# Patient Record
Sex: MALE | Race: BLACK OR AFRICAN AMERICAN | NOT HISPANIC OR LATINO | URBAN - METROPOLITAN AREA
[De-identification: names, ages, dates, MRNs, and addresses within clinical notes are randomized per-mention and may not be internally consistent; named-entity substitution may affect disease eponyms.]

---

## 2023-07-07 DIAGNOSIS — I10 PRIMARY HYPERTENSION: ICD-10-CM

## 2023-07-07 DIAGNOSIS — E78.5 HYPERLIPIDEMIA LDL GOAL <100: ICD-10-CM

## 2023-07-07 DIAGNOSIS — E11.9 TYPE 2 DIABETES MELLITUS WITHOUT COMPLICATION, WITHOUT LONG-TERM CURRENT USE OF INSULIN (MULTI): ICD-10-CM

## 2023-07-07 RX ORDER — ATORVASTATIN CALCIUM 40 MG/1
40 TABLET, FILM COATED ORAL DAILY
COMMUNITY
Start: 2022-03-28 | End: 2023-07-07 | Stop reason: SDUPTHER

## 2023-07-07 RX ORDER — SITAGLIPTIN 25 MG/1
25 TABLET, FILM COATED ORAL DAILY
COMMUNITY
Start: 2022-03-28 | End: 2023-07-07 | Stop reason: SDUPTHER

## 2023-07-07 RX ORDER — METFORMIN HYDROCHLORIDE 500 MG/1
2 TABLET ORAL 2 TIMES DAILY
COMMUNITY
Start: 2022-02-18 | End: 2023-07-07 | Stop reason: SDUPTHER

## 2023-07-07 RX ORDER — AMLODIPINE BESYLATE 10 MG/1
10 TABLET ORAL DAILY
COMMUNITY
Start: 2022-03-28 | End: 2023-07-07 | Stop reason: SDUPTHER

## 2023-07-07 RX ORDER — LISINOPRIL 10 MG/1
10 TABLET ORAL DAILY
COMMUNITY
Start: 2022-03-28 | End: 2023-07-07 | Stop reason: SDUPTHER

## 2023-07-08 RX ORDER — ATORVASTATIN CALCIUM 40 MG/1
40 TABLET, FILM COATED ORAL DAILY
Qty: 90 TABLET | Refills: 0 | Status: SHIPPED | OUTPATIENT
Start: 2023-07-08 | End: 2023-10-19 | Stop reason: SDUPTHER

## 2023-07-08 RX ORDER — LISINOPRIL 10 MG/1
10 TABLET ORAL DAILY
Qty: 90 TABLET | Refills: 0 | Status: SHIPPED | OUTPATIENT
Start: 2023-07-08 | End: 2023-08-08 | Stop reason: ENTERED-IN-ERROR

## 2023-07-08 RX ORDER — AMLODIPINE BESYLATE 10 MG/1
10 TABLET ORAL DAILY
Qty: 90 TABLET | Refills: 0 | Status: SHIPPED | OUTPATIENT
Start: 2023-07-08 | End: 2023-10-19 | Stop reason: SDUPTHER

## 2023-07-08 RX ORDER — METFORMIN HYDROCHLORIDE 500 MG/1
1000 TABLET ORAL 2 TIMES DAILY
Qty: 360 TABLET | Refills: 0 | Status: SHIPPED | OUTPATIENT
Start: 2023-07-08 | End: 2023-10-19 | Stop reason: SDUPTHER

## 2023-07-18 ENCOUNTER — APPOINTMENT (OUTPATIENT)
Dept: PRIMARY CARE | Facility: CLINIC | Age: 58
End: 2023-07-18
Payer: COMMERCIAL

## 2023-07-28 ENCOUNTER — OFFICE VISIT (OUTPATIENT)
Dept: PRIMARY CARE | Facility: CLINIC | Age: 58
End: 2023-07-28
Payer: COMMERCIAL

## 2023-07-28 DIAGNOSIS — I10 HYPERTENSION, UNSPECIFIED TYPE: Primary | ICD-10-CM

## 2023-07-28 DIAGNOSIS — Z12.5 SCREENING FOR PROSTATE CANCER: ICD-10-CM

## 2023-07-28 DIAGNOSIS — E78.5 HYPERLIPIDEMIA, UNSPECIFIED HYPERLIPIDEMIA TYPE: ICD-10-CM

## 2023-07-28 DIAGNOSIS — E11.69 TYPE 2 DIABETES MELLITUS WITH OTHER SPECIFIED COMPLICATION, WITHOUT LONG-TERM CURRENT USE OF INSULIN (MULTI): ICD-10-CM

## 2023-07-29 LAB
ALBUMIN (MG/L) IN URINE: 264.8 MG/L
ALBUMIN/CREATININE (UG/MG) IN URINE: 161.5 UG/MG CRT (ref 0–30)
CREATININE (MG/DL) IN URINE: 164 MG/DL (ref 20–370)
ESTIMATED AVERAGE GLUCOSE FOR HBA1C: 192 MG/DL
HEMOGLOBIN A1C/HEMOGLOBIN TOTAL IN BLOOD: 8.3 %
PROSTATE SPECIFIC ANTIGEN,SCREEN: 0.32 NG/ML (ref 0–4)

## 2023-08-08 ENCOUNTER — OFFICE VISIT (OUTPATIENT)
Dept: PRIMARY CARE | Facility: CLINIC | Age: 58
End: 2023-08-08
Payer: COMMERCIAL

## 2023-08-08 VITALS
WEIGHT: 232.6 LBS | BODY MASS INDEX: 35.25 KG/M2 | HEART RATE: 83 BPM | DIASTOLIC BLOOD PRESSURE: 76 MMHG | HEIGHT: 68 IN | OXYGEN SATURATION: 100 % | SYSTOLIC BLOOD PRESSURE: 150 MMHG

## 2023-08-08 DIAGNOSIS — Z09 FOLLOW-UP EXAM: Primary | ICD-10-CM

## 2023-08-08 DIAGNOSIS — E11.3213 BOTH EYES AFFECTED BY MILD NONPROLIFERATIVE DIABETIC RETINOPATHY WITH MACULAR EDEMA, ASSOCIATED WITH TYPE 2 DIABETES MELLITUS (MULTI): ICD-10-CM

## 2023-08-08 DIAGNOSIS — E66.01 CLASS 2 SEVERE OBESITY DUE TO EXCESS CALORIES WITH SERIOUS COMORBIDITY AND BODY MASS INDEX (BMI) OF 35.0 TO 35.9 IN ADULT (MULTI): ICD-10-CM

## 2023-08-08 DIAGNOSIS — Z13.6 SCREENING, ISCHEMIC HEART DISEASE: ICD-10-CM

## 2023-08-08 DIAGNOSIS — E11.9 TYPE 2 DIABETES MELLITUS WITHOUT COMPLICATION, WITHOUT LONG-TERM CURRENT USE OF INSULIN (MULTI): ICD-10-CM

## 2023-08-08 DIAGNOSIS — G47.33 OBSTRUCTIVE SLEEP APNEA: ICD-10-CM

## 2023-08-08 DIAGNOSIS — R80.9 DIABETES MELLITUS DUE TO UNDERLYING CONDITION WITH MICROALBUMINURIA, WITHOUT LONG-TERM CURRENT USE OF INSULIN (MULTI): ICD-10-CM

## 2023-08-08 DIAGNOSIS — E08.29 DIABETES MELLITUS DUE TO UNDERLYING CONDITION WITH MICROALBUMINURIA, WITHOUT LONG-TERM CURRENT USE OF INSULIN (MULTI): ICD-10-CM

## 2023-08-08 DIAGNOSIS — I10 HYPERTENSION, UNCONTROLLED: ICD-10-CM

## 2023-08-08 DIAGNOSIS — I10 PRIMARY HYPERTENSION: ICD-10-CM

## 2023-08-08 PROBLEM — E66.812 CLASS 2 SEVERE OBESITY DUE TO EXCESS CALORIES WITH SERIOUS COMORBIDITY AND BODY MASS INDEX (BMI) OF 35.0 TO 35.9 IN ADULT: Status: ACTIVE | Noted: 2023-08-08

## 2023-08-08 PROCEDURE — 3008F BODY MASS INDEX DOCD: CPT | Performed by: INTERNAL MEDICINE

## 2023-08-08 PROCEDURE — 1036F TOBACCO NON-USER: CPT | Performed by: INTERNAL MEDICINE

## 2023-08-08 PROCEDURE — 3078F DIAST BP <80 MM HG: CPT | Performed by: INTERNAL MEDICINE

## 2023-08-08 PROCEDURE — 99213 OFFICE O/P EST LOW 20 MIN: CPT | Performed by: INTERNAL MEDICINE

## 2023-08-08 PROCEDURE — 3077F SYST BP >= 140 MM HG: CPT | Performed by: INTERNAL MEDICINE

## 2023-08-08 PROCEDURE — 3052F HG A1C>EQUAL 8.0%<EQUAL 9.0%: CPT | Performed by: INTERNAL MEDICINE

## 2023-08-08 PROCEDURE — 4010F ACE/ARB THERAPY RXD/TAKEN: CPT | Performed by: INTERNAL MEDICINE

## 2023-08-08 RX ORDER — LOSARTAN POTASSIUM 25 MG/1
25 TABLET ORAL DAILY
Qty: 30 TABLET | Refills: 11 | Status: SHIPPED | OUTPATIENT
Start: 2023-08-08 | End: 2023-10-19 | Stop reason: SDUPTHER

## 2023-08-08 ASSESSMENT — PATIENT HEALTH QUESTIONNAIRE - PHQ9
2. FEELING DOWN, DEPRESSED OR HOPELESS: NOT AT ALL
SUM OF ALL RESPONSES TO PHQ9 QUESTIONS 1 AND 2: 0
2. FEELING DOWN, DEPRESSED OR HOPELESS: NOT AT ALL
1. LITTLE INTEREST OR PLEASURE IN DOING THINGS: NOT AT ALL
SUM OF ALL RESPONSES TO PHQ9 QUESTIONS 1 AND 2: 0
1. LITTLE INTEREST OR PLEASURE IN DOING THINGS: NOT AT ALL

## 2023-08-08 ASSESSMENT — ENCOUNTER SYMPTOMS
PALPITATIONS: 0
SHORTNESS OF BREATH: 0
POLYDIPSIA: 0
POLYPHAGIA: 0
ORTHOPNEA: 0
HYPERTENSION: 1
VISUAL CHANGE: 0
CONSTITUTIONAL NEGATIVE: 1
PND: 0
DIABETIC ASSOCIATED SYMPTOMS: 0
BLURRED VISION: 0
FATIGUE: 0

## 2023-09-08 ENCOUNTER — OFFICE VISIT (OUTPATIENT)
Dept: PRIMARY CARE | Facility: CLINIC | Age: 58
End: 2023-09-08
Payer: COMMERCIAL

## 2023-09-08 ENCOUNTER — LAB (OUTPATIENT)
Dept: LAB | Facility: LAB | Age: 58
End: 2023-09-08
Payer: COMMERCIAL

## 2023-09-08 VITALS
HEART RATE: 79 BPM | SYSTOLIC BLOOD PRESSURE: 124 MMHG | WEIGHT: 237.8 LBS | OXYGEN SATURATION: 100 % | BODY MASS INDEX: 36.7 KG/M2 | DIASTOLIC BLOOD PRESSURE: 70 MMHG

## 2023-09-08 DIAGNOSIS — Z13.89 SCREENING FOR OBESITY: ICD-10-CM

## 2023-09-08 DIAGNOSIS — G47.33 OBSTRUCTIVE SLEEP APNEA: ICD-10-CM

## 2023-09-08 DIAGNOSIS — E66.01 CLASS 2 SEVERE OBESITY DUE TO EXCESS CALORIES WITH SERIOUS COMORBIDITY AND BODY MASS INDEX (BMI) OF 36.0 TO 36.9 IN ADULT (MULTI): ICD-10-CM

## 2023-09-08 DIAGNOSIS — E11.69 TYPE 2 DIABETES MELLITUS WITH OTHER SPECIFIED COMPLICATION, WITHOUT LONG-TERM CURRENT USE OF INSULIN (MULTI): ICD-10-CM

## 2023-09-08 DIAGNOSIS — E11.9 TYPE 2 DIABETES MELLITUS WITHOUT COMPLICATION, WITHOUT LONG-TERM CURRENT USE OF INSULIN (MULTI): ICD-10-CM

## 2023-09-08 DIAGNOSIS — I10 PRIMARY HYPERTENSION: ICD-10-CM

## 2023-09-08 DIAGNOSIS — E11.3213 BOTH EYES AFFECTED BY MILD NONPROLIFERATIVE DIABETIC RETINOPATHY WITH MACULAR EDEMA, ASSOCIATED WITH TYPE 2 DIABETES MELLITUS (MULTI): ICD-10-CM

## 2023-09-08 DIAGNOSIS — E11.69 TYPE 2 DIABETES MELLITUS WITH OTHER SPECIFIED COMPLICATION, WITHOUT LONG-TERM CURRENT USE OF INSULIN (MULTI): Primary | ICD-10-CM

## 2023-09-08 LAB
ALANINE AMINOTRANSFERASE (SGPT) (U/L) IN SER/PLAS: 36 U/L (ref 10–52)
ALBUMIN (G/DL) IN SER/PLAS: 4.3 G/DL (ref 3.4–5)
ALKALINE PHOSPHATASE (U/L) IN SER/PLAS: 114 U/L (ref 33–120)
ANION GAP IN SER/PLAS: 15 MMOL/L (ref 10–20)
ASPARTATE AMINOTRANSFERASE (SGOT) (U/L) IN SER/PLAS: 23 U/L (ref 9–39)
BILIRUBIN TOTAL (MG/DL) IN SER/PLAS: 0.7 MG/DL (ref 0–1.2)
CALCIUM (MG/DL) IN SER/PLAS: 9.5 MG/DL (ref 8.6–10.6)
CARBON DIOXIDE, TOTAL (MMOL/L) IN SER/PLAS: 27 MMOL/L (ref 21–32)
CHLORIDE (MMOL/L) IN SER/PLAS: 100 MMOL/L (ref 98–107)
CREATININE (MG/DL) IN SER/PLAS: 1.38 MG/DL (ref 0.5–1.3)
GFR MALE: 60 ML/MIN/1.73M2
GLUCOSE (MG/DL) IN SER/PLAS: 299 MG/DL (ref 74–99)
POTASSIUM (MMOL/L) IN SER/PLAS: 5 MMOL/L (ref 3.5–5.3)
PROTEIN TOTAL: 7.4 G/DL (ref 6.4–8.2)
SODIUM (MMOL/L) IN SER/PLAS: 137 MMOL/L (ref 136–145)
UREA NITROGEN (MG/DL) IN SER/PLAS: 13 MG/DL (ref 6–23)

## 2023-09-08 PROCEDURE — 3074F SYST BP LT 130 MM HG: CPT | Performed by: INTERNAL MEDICINE

## 2023-09-08 PROCEDURE — 99213 OFFICE O/P EST LOW 20 MIN: CPT | Performed by: INTERNAL MEDICINE

## 2023-09-08 PROCEDURE — 80053 COMPREHEN METABOLIC PANEL: CPT

## 2023-09-08 PROCEDURE — 3052F HG A1C>EQUAL 8.0%<EQUAL 9.0%: CPT | Performed by: INTERNAL MEDICINE

## 2023-09-08 PROCEDURE — 1036F TOBACCO NON-USER: CPT | Performed by: INTERNAL MEDICINE

## 2023-09-08 PROCEDURE — 3008F BODY MASS INDEX DOCD: CPT | Performed by: INTERNAL MEDICINE

## 2023-09-08 PROCEDURE — 36415 COLL VENOUS BLD VENIPUNCTURE: CPT

## 2023-09-08 PROCEDURE — 3078F DIAST BP <80 MM HG: CPT | Performed by: INTERNAL MEDICINE

## 2023-09-08 PROCEDURE — 4010F ACE/ARB THERAPY RXD/TAKEN: CPT | Performed by: INTERNAL MEDICINE

## 2023-09-08 ASSESSMENT — PATIENT HEALTH QUESTIONNAIRE - PHQ9
SUM OF ALL RESPONSES TO PHQ9 QUESTIONS 1 AND 2: 0
2. FEELING DOWN, DEPRESSED OR HOPELESS: NOT AT ALL
1. LITTLE INTEREST OR PLEASURE IN DOING THINGS: NOT AT ALL
1. LITTLE INTEREST OR PLEASURE IN DOING THINGS: NOT AT ALL
SUM OF ALL RESPONSES TO PHQ9 QUESTIONS 1 AND 2: 0
2. FEELING DOWN, DEPRESSED OR HOPELESS: NOT AT ALL

## 2023-09-08 ASSESSMENT — ENCOUNTER SYMPTOMS
NECK PAIN: 0
PALPITATIONS: 0
BLURRED VISION: 0
CONSTITUTIONAL NEGATIVE: 1
SHORTNESS OF BREATH: 0
HYPERTENSION: 1
HEADACHES: 0
PND: 0
ORTHOPNEA: 0

## 2023-09-08 NOTE — PROGRESS NOTES
Patient ID: DENNIS Gutierrez Jr. is a 58 y.o. male who presents for Follow-up (Blood pressure).    /70   Pulse 79   Wt 108 kg (237 lb 12.8 oz)   SpO2 100%   BMI 36.70 kg/m²     Hypertension  This is a chronic problem. The current episode started more than 1 year ago. The problem has been resolved since onset. The problem is controlled. Pertinent negatives include no anxiety, blurred vision, chest pain, headaches, malaise/fatigue, neck pain, orthopnea, palpitations, peripheral edema, PND or shortness of breath. There are no associated agents to hypertension. Risk factors for coronary artery disease include male gender and obesity. Past treatments include ACE inhibitors and calcium channel blockers. The current treatment provides significant improvement. There are no compliance problems.  There is no history of angina, kidney disease, CAD/MI, CVA, heart failure, left ventricular hypertrophy, PVD or retinopathy. There is no history of chronic renal disease.       Subjective     Review of Systems   Constitutional: Negative.  Negative for malaise/fatigue.   Eyes:  Negative for blurred vision.   Respiratory:  Negative for shortness of breath.    Cardiovascular:  Negative for chest pain, palpitations, orthopnea and PND.   Musculoskeletal:  Negative for neck pain.   Neurological:  Negative for headaches.   All other systems reviewed and are negative.      Objective     Physical Exam  Constitutional:       Appearance: He is obese.   Neck:      Vascular: No carotid bruit.   Cardiovascular:      Rate and Rhythm: Normal rate and regular rhythm.      Pulses: Normal pulses.      Heart sounds: Normal heart sounds. No murmur heard.  Pulmonary:      Effort: Pulmonary effort is normal.      Breath sounds: Normal breath sounds.   Musculoskeletal:      Right lower leg: No edema.      Left lower leg: No edema.   Lymphadenopathy:      Cervical: No cervical adenopathy.   Skin:     Capillary Refill: Capillary refill takes more than 3  seconds.   Neurological:      General: No focal deficit present.      Mental Status: He is oriented to person, place, and time. Mental status is at baseline.   Psychiatric:         Mood and Affect: Mood normal.         Behavior: Behavior normal.         Thought Content: Thought content normal.         Judgment: Judgment normal.         Lab Results   Component Value Date    WBC 7.3 07/25/2022    HGB 14.0 07/25/2022    HCT 40.4 (L) 07/25/2022    MCV 93 07/25/2022     07/25/2022           Problem List Items Addressed This Visit       Type 2 diabetes mellitus without complication, without long-term current use of insulin (CMS/[facility])    Class 2 severe obesity due to excess calories with serious comorbidity and body mass index (BMI) of 36.0 to 36.9 in adult (CMS/[facility])    Primary hypertension    Both eyes affected by mild nonproliferative diabetic retinopathy with macular edema, associated with type 2 diabetes mellitus (CMS/[facility])    Obstructive sleep apnea    Screening for obesity     Other Visit Diagnoses       Type 2 diabetes mellitus with other specified complication, without long-term current use of insulin (CMS/[facility])    -  Primary    Relevant Orders    Comprehensive metabolic panel                 A/p         Continue LOSARTAN  25MG A DAY   CMP TODAY

## 2023-10-09 ENCOUNTER — HOSPITAL ENCOUNTER (OUTPATIENT)
Dept: RADIOLOGY | Facility: HOSPITAL | Age: 58
Discharge: HOME | End: 2023-10-09
Payer: COMMERCIAL

## 2023-10-09 DIAGNOSIS — Z13.6 ENCOUNTER FOR SCREENING FOR CARDIOVASCULAR DISORDERS: ICD-10-CM

## 2023-10-09 PROCEDURE — 75571 CT HRT W/O DYE W/CA TEST: CPT

## 2023-11-09 ENCOUNTER — OFFICE VISIT (OUTPATIENT)
Dept: NEPHROLOGY | Facility: CLINIC | Age: 58
End: 2023-11-09
Payer: COMMERCIAL

## 2023-11-09 VITALS
HEIGHT: 68 IN | WEIGHT: 239 LBS | HEART RATE: 79 BPM | SYSTOLIC BLOOD PRESSURE: 130 MMHG | BODY MASS INDEX: 36.22 KG/M2 | DIASTOLIC BLOOD PRESSURE: 88 MMHG

## 2023-11-09 DIAGNOSIS — N18.2 CKD (CHRONIC KIDNEY DISEASE) STAGE 2, GFR 60-89 ML/MIN: Primary | ICD-10-CM

## 2023-11-09 PROBLEM — D12.6 TUBULOVILLOUS ADENOMA POLYP OF COLON: Status: ACTIVE | Noted: 2023-11-09

## 2023-11-09 PROBLEM — L72.9 SKIN CYST: Status: ACTIVE | Noted: 2023-11-09

## 2023-11-09 PROBLEM — M16.11 OSTEOARTHRITIS OF RIGHT HIP: Status: ACTIVE | Noted: 2023-11-09

## 2023-11-09 PROBLEM — H35.039 HYPERTENSIVE RETINOPATHY: Status: ACTIVE | Noted: 2023-11-09

## 2023-11-09 PROBLEM — H40.009 GLAUCOMA SUSPECT: Status: ACTIVE | Noted: 2023-11-09

## 2023-11-09 PROBLEM — R79.89 ELEVATED SERUM CREATININE: Status: ACTIVE | Noted: 2023-11-09

## 2023-11-09 PROBLEM — E11.3299 NPDR (NONPROLIFERATIVE DIABETIC RETINOPATHY) (MULTI): Status: ACTIVE | Noted: 2023-11-09

## 2023-11-09 PROBLEM — N52.9 ERECTILE DYSFUNCTION: Status: ACTIVE | Noted: 2023-11-09

## 2023-11-09 PROBLEM — E34.9 TESTOSTERONE DEFICIENCY: Status: ACTIVE | Noted: 2023-11-09

## 2023-11-09 PROCEDURE — 3008F BODY MASS INDEX DOCD: CPT | Performed by: INTERNAL MEDICINE

## 2023-11-09 PROCEDURE — 3079F DIAST BP 80-89 MM HG: CPT | Performed by: INTERNAL MEDICINE

## 2023-11-09 PROCEDURE — 3074F SYST BP LT 130 MM HG: CPT | Performed by: INTERNAL MEDICINE

## 2023-11-09 PROCEDURE — 4010F ACE/ARB THERAPY RXD/TAKEN: CPT | Performed by: INTERNAL MEDICINE

## 2023-11-09 PROCEDURE — 3066F NEPHROPATHY DOC TX: CPT | Performed by: INTERNAL MEDICINE

## 2023-11-09 PROCEDURE — 3052F HG A1C>EQUAL 8.0%<EQUAL 9.0%: CPT | Performed by: INTERNAL MEDICINE

## 2023-11-09 PROCEDURE — 99204 OFFICE O/P NEW MOD 45 MIN: CPT | Performed by: INTERNAL MEDICINE

## 2023-11-09 PROCEDURE — 1036F TOBACCO NON-USER: CPT | Performed by: INTERNAL MEDICINE

## 2023-11-09 RX ORDER — DAPAGLIFLOZIN 10 MG/1
10 TABLET, FILM COATED ORAL DAILY
Qty: 90 TABLET | Refills: 0 | Status: SHIPPED | OUTPATIENT
Start: 2023-11-09 | End: 2023-12-28 | Stop reason: SDUPTHER

## 2023-11-09 ASSESSMENT — PAIN SCALES - GENERAL: PAINLEVEL: 0-NO PAIN

## 2023-11-09 NOTE — PROGRESS NOTES
"Referred by Dr Key for elevated creatinine  H/o T2 DM since 2014No h/o retinopathy, s/p injections this year. H/o neuropathy since <1 year  H/o htn since <1 years. No h/o ER visit/hospitalization for uncontrolled htn.  No h/o CAD/CHF/PAD/stroke/TIA  No h/o chronic diarrhea  No h/o kidney stones   h/o regular NSAID use for 5-6 years before hip surgeries in 2015 and 2010, now uses it rarely  No h/o OTC supplements/ herbal medications use  No h/o gout  No h/o cancers  No h/o autoimmune diseases like lupus, RA, sarcoidosis,Sjogren's  No h/o blood clots  No h/o leg swelling/Desai/orthopnea/hematuria/cola colored/frothy urine  FH: dialysis/kidney stones/kidney disease  Occupational history: no lead exposure  Smoking/alcohol/illicit drug use ; non-smoker, social alcohol, no drug use    RoS negative for all other systems except as noted above.      Vitals:    11/09/23 1634 11/09/23 1635 11/09/23 1636 11/09/23 1637   BP: 131/88 126/83 (!) 132/94 130/88   Pulse: 79 79 79 79   Weight:       Height:    1.727 m (5' 8\")        No distress  HEENT:  moist, no pallor  No edema emi LE  Breath sounds emi equal, clear  S1 S2 regular, normal, no rub or murmur  Abdomen soft, non tender  AAO x3, non focal    Sodium   Date/Time Value Ref Range Status   09/08/2023 09:21  136 - 145 mmol/L Final   07/25/2022 08:29  (L) 136 - 145 mmol/L Final   03/14/2022 08:26  (L) 136 - 145 mmol/L Final     Potassium   Date/Time Value Ref Range Status   09/08/2023 09:21 AM 5.0 3.5 - 5.3 mmol/L Final   07/25/2022 08:29 AM 4.6 3.5 - 5.3 mmol/L Final   03/14/2022 08:26 AM 4.5 3.5 - 5.3 mmol/L Final     Chloride   Date/Time Value Ref Range Status   09/08/2023 09:21  98 - 107 mmol/L Final   07/25/2022 08:29 AM 97 (L) 98 - 107 mmol/L Final   03/14/2022 08:26 AM 97 (L) 98 - 107 mmol/L Final     Urea Nitrogen   Date/Time Value Ref Range Status   09/08/2023 09:21 AM 13 6 - 23 mg/dL Final   07/25/2022 08:29 AM 13 6 - 23 mg/dL Final " "  03/14/2022 08:26 AM 15 6 - 23 mg/dL Final     Creatinine   Date/Time Value Ref Range Status   09/08/2023 09:21 AM 1.38 (H) 0.50 - 1.30 mg/dL Final   07/25/2022 08:29 AM 1.23 0.50 - 1.30 mg/dL Final   03/14/2022 08:26 AM 1.52 (H) 0.50 - 1.30 mg/dL Final     Calcium   Date/Time Value Ref Range Status   09/08/2023 09:21 AM 9.5 8.6 - 10.6 mg/dL Final   07/25/2022 08:29 AM 9.7 8.6 - 10.6 mg/dL Final   03/14/2022 08:26 AM 9.3 8.6 - 10.6 mg/dL Final     Albumin/Creatine Ratio   Date/Time Value Ref Range Status   07/28/2023 07:49 .5 (H) 0.0 - 30.0 ug/mg crt Final   07/25/2022 08:29 .3 (H) 0.0 - 30.0 ug/mg crt Final   03/14/2022 08:26 .4 (H) 0.0 - 30.0 ug/mg crt Final     Hemoglobin   Date/Time Value Ref Range Status   07/25/2022 08:29 AM 14.0 13.5 - 17.5 g/dL Final     58 year old  with long standing h/o DM, htn AND  CKD      1. CKD G2A2 :   Labs from 9/8/23 reviewed.   Lab Results   Component Value Date    CREATININE 1.38 (H) 09/08/2023    No results found for: \"ALBUR\", \"[id number]\"   Lab Results   Component Value Date    GFRMALE 60 09/08/2023    GFRMALE 69 07/25/2022    GFRMALE 54 (A) 03/14/2022     eGFR 60  ml/min/1.73 m2, stable. .Avoid NSAIDs and iodinated contrast. Discussed importance of good BP and blood sugar control  Will check JUDITH, C3, C4, ANCA, cryoglobulin, Hep B, C, HIV serologies, sr BASHIR and renal US to rule out other etiologies of kidney disease.  We discussed the group of drugs called SGLT2 inhibitors [for example Invokana or canagliflozin, Jardiance or empagliflozin and Farixga or dapagliflozin ].  We discussed emerging data regarding beneficial effects of these drugs in slowing down the rate of progression of kidney disease in patients with protein in the urine as well as reducing the rates of hospitalization with heart failure and other bad cardiovascular outcomes in patients with heart failure and heart disease.  We also discussed the severe side effects of these drugs including the " "risk of serious genital and urinary tract infections including fungal infections and Barby's gangrene, euglycemic ketoacidosis, increased risk of fractures and amputations.  The other side serious side effects that we discussed with the risk of low blood sugar, low blood pressure and dehydration.We discussed the \"sick day rule\"-if you cannot eat  and drink normally, stop the medication immediately and if you don't feel better, seek help immediately. We also discussed need to hold the med 3-5 days in advance of fasting for any reason. He elected to start Farxiga 10 mg PO daily, rx sent    2. Volume status:  Euvolemic    3. Anemia:   Lab Results   Component Value Date    HGB 14.0 07/25/2022   Monitor    4. MBD:   Lab Results   Component Value Date    CALCIUM 9.5 09/08/2023    No results found for: \"VITD25\" No results found for: \"PTH\"      Check Ca, PO4, 25 OH vit D and PTH before next visit      6. Hypertension: Close to goal, continue current meds, amlodipine 10 mg/day and losartan 25 mg/day, expect improved control of BP with addition of SGLT2i   2.5 g/day sodium diet  Monitor BP at home and call if persistently higher than 120/80    RTC:  3-4 mo   "

## 2023-11-09 NOTE — PATIENT INSTRUCTIONS
"Start farxiga 10 mg once a day  Check BP at home, goal is 120/80 mm HG, if more than that call me  We discussed the group of drugs called SGLT2 inhibitors [for example Invokana or canagliflozin, Jardiance or empagliflozin and Farixga or dapagliflozin ].  We discussed emerging data regarding beneficial effects of these drugs in slowing down the rate of progression of kidney disease in patients with protein in the urine as well as reducing the rates of hospitalization with heart failure and other bad cardiovascular outcomes in patients with heart failure and heart disease.  We also discussed the severe side effects of these drugs including the risk of serious genital and urinary tract infections including fungal infections and Barby's gangrene, euglycemic ketoacidosis, increased risk of fractures and amputations.  The other side serious side effects that we discussed with the risk of low blood sugar, low blood pressure and dehydration.We discussed the \"sick day rule\"-if you cannot eat  and drink normally, stop the medication immediately and if you don't feel better, seek help immediately. We also discussed need to hold the med 3-5 days in advance of fasting for any reason.   "

## 2023-11-10 ENCOUNTER — APPOINTMENT (OUTPATIENT)
Dept: ENDOCRINOLOGY | Facility: CLINIC | Age: 58
End: 2023-11-10
Payer: COMMERCIAL

## 2023-11-21 ENCOUNTER — OFFICE VISIT (OUTPATIENT)
Dept: ENDOCRINOLOGY | Facility: CLINIC | Age: 58
End: 2023-11-21
Payer: COMMERCIAL

## 2023-11-21 VITALS
HEART RATE: 79 BPM | BODY MASS INDEX: 35.71 KG/M2 | HEIGHT: 68 IN | SYSTOLIC BLOOD PRESSURE: 136 MMHG | DIASTOLIC BLOOD PRESSURE: 84 MMHG | WEIGHT: 235.6 LBS

## 2023-11-21 DIAGNOSIS — E11.3213 BOTH EYES AFFECTED BY MILD NONPROLIFERATIVE DIABETIC RETINOPATHY WITH MACULAR EDEMA, ASSOCIATED WITH TYPE 2 DIABETES MELLITUS (MULTI): ICD-10-CM

## 2023-11-21 DIAGNOSIS — E11.65 TYPE 2 DIABETES MELLITUS WITH HYPERGLYCEMIA, WITHOUT LONG-TERM CURRENT USE OF INSULIN (MULTI): Primary | ICD-10-CM

## 2023-11-21 DIAGNOSIS — I10 HYPERTENSION, UNCONTROLLED: ICD-10-CM

## 2023-11-21 DIAGNOSIS — E66.01 CLASS 2 SEVERE OBESITY DUE TO EXCESS CALORIES WITH SERIOUS COMORBIDITY AND BODY MASS INDEX (BMI) OF 36.0 TO 36.9 IN ADULT (MULTI): ICD-10-CM

## 2023-11-21 DIAGNOSIS — E78.2 HYPERLIPEMIA, MIXED: ICD-10-CM

## 2023-11-21 DIAGNOSIS — G47.33 OBSTRUCTIVE SLEEP APNEA: ICD-10-CM

## 2023-11-21 LAB — POC HEMOGLOBIN A1C: 8.3 % (ref 4.2–6.5)

## 2023-11-21 PROCEDURE — 3075F SYST BP GE 130 - 139MM HG: CPT | Performed by: NURSE PRACTITIONER

## 2023-11-21 PROCEDURE — 99205 OFFICE O/P NEW HI 60 MIN: CPT | Performed by: NURSE PRACTITIONER

## 2023-11-21 PROCEDURE — 3008F BODY MASS INDEX DOCD: CPT | Performed by: NURSE PRACTITIONER

## 2023-11-21 PROCEDURE — 83036 HEMOGLOBIN GLYCOSYLATED A1C: CPT | Performed by: NURSE PRACTITIONER

## 2023-11-21 PROCEDURE — 3079F DIAST BP 80-89 MM HG: CPT | Performed by: NURSE PRACTITIONER

## 2023-11-21 PROCEDURE — 4010F ACE/ARB THERAPY RXD/TAKEN: CPT | Performed by: NURSE PRACTITIONER

## 2023-11-21 PROCEDURE — 3066F NEPHROPATHY DOC TX: CPT | Performed by: NURSE PRACTITIONER

## 2023-11-21 PROCEDURE — 3052F HG A1C>EQUAL 8.0%<EQUAL 9.0%: CPT | Performed by: NURSE PRACTITIONER

## 2023-11-21 PROCEDURE — 95249 CONT GLUC MNTR PT PROV EQP: CPT | Performed by: NURSE PRACTITIONER

## 2023-11-21 RX ORDER — BLOOD-GLUCOSE SENSOR
1 EACH MISCELLANEOUS
Qty: 6 EACH | Refills: 3 | Status: SHIPPED | OUTPATIENT
Start: 2023-11-21 | End: 2024-03-25 | Stop reason: SDUPTHER

## 2023-11-21 RX ORDER — ACETAMINOPHEN 500 MG
1 TABLET ORAL DAILY
Qty: 1 EACH | Refills: 0 | Status: SHIPPED | OUTPATIENT
Start: 2023-11-21

## 2023-11-21 RX ORDER — TIRZEPATIDE 2.5 MG/.5ML
2.5 INJECTION, SOLUTION SUBCUTANEOUS
Qty: 2 ML | Refills: 2 | Status: SHIPPED | OUTPATIENT
Start: 2023-11-21 | End: 2024-02-07 | Stop reason: ALTCHOICE

## 2023-11-21 ASSESSMENT — ENCOUNTER SYMPTOMS
FATIGUE: 0
NUMBNESS: 0
FREQUENCY: 0
WEAKNESS: 0
CONSTIPATION: 0
DIZZINESS: 0
SLEEP DISTURBANCE: 0
DIARRHEA: 0
POLYPHAGIA: 0
NAUSEA: 0
POLYDIPSIA: 0
NERVOUS/ANXIOUS: 0
ACTIVITY CHANGE: 0
PALPITATIONS: 0
APPETITE CHANGE: 0
SEIZURES: 0
SHORTNESS OF BREATH: 0

## 2023-11-21 NOTE — PATIENT INSTRUCTIONS
Type 2 diabetes mellitus, is not at goal. A1C 8.3% July 2023 and 8.3% in office today.     Discussed that with retinopathy and obesity he is at high risk for CV complications.     RX changes:   STOP Januvia 25 daily  START Mounjaro 2.5 mg weekly for 4-8 weeks as tolerated with increase to 5 mg weekly.   START Tracey 3. Sensor initiated in office today and linked to practice. Patient instructed on use, care, when to check glucose with meter, when to call for support.   CONTINUE Metformin 500 mg 2 tablets twice daily  CONTINUE Farxiga 10 mg daily  Education:  interpretation of lab results, blood sugar goals, and complications of diabetes mellitus  Sleep Apnea: Instructed to use mask nightly.  Hypertension: Elevated at today's visit. Ordered BP cuff and instructed to send BP readings in to nephrologist for medication titration.  Hyperlipidemia: Recommend higher dose statin.   Follow up: I recommend diabetes care be with Pharm D in 5 weeks and myself in 3 months

## 2023-11-21 NOTE — PROGRESS NOTES
Subjective   DENNIS Gutierrez Jr. is a 58 y.o. male who presents for initial visit for evaluation of Type 2 diabetes mellitus. The initial diagnosis of diabetes was made  in 2014 . The patient does not have a known family history of type 2 diabetes.    Known complications due to diabetes included retinopathy and proteinuria    Cardiovascular risk factors include advanced age (older than 55 for men, 65 for women), diabetes mellitus, dyslipidemia, hypertension, male gender, microalbuminuria, and obesity (BMI >= 30 kg/m2). The patient is on an ACE inhibitor or angiotensin II receptor blocker.      Current diabetes regimen is as follows:   Januvia 25 daily  Metformin 500 mg 2 tablets twice daily  Farxiga 10 mg daily    The patient is not currently checking the blood glucose     Hypoglycemia frequency: none  Hypoglycemia awareness: Yes     Exercise: every other day   Meal panning: He is using no plan.    Review of Systems   Constitutional:  Negative for activity change, appetite change and fatigue.   Respiratory:  Negative for shortness of breath.    Cardiovascular:  Negative for chest pain, palpitations and leg swelling.   Gastrointestinal:  Negative for constipation, diarrhea and nausea.   Endocrine: Negative for cold intolerance, heat intolerance, polydipsia, polyphagia and polyuria.   Genitourinary:  Negative for frequency.   Musculoskeletal:  Negative for gait problem.   Skin:  Negative for rash.   Neurological:  Negative for dizziness, seizures, weakness and numbness.   Psychiatric/Behavioral:  Negative for sleep disturbance and suicidal ideas. The patient is not nervous/anxious.        Objective   There were no vitals taken for this visit.  Physical Exam  Constitutional:       Appearance: He is obese.   Pulmonary:      Effort: Pulmonary effort is normal.   Skin:     General: Skin is warm and dry.   Neurological:      Mental Status: He is alert and oriented to person, place, and time.   Psychiatric:         Mood and  Affect: Mood normal.         Behavior: Behavior normal.         Thought Content: Thought content normal.         Judgment: Judgment normal.       Lab Review  Glucose (mg/dL)   Date Value   09/08/2023 299 (H)   07/25/2022 264 (H)   03/14/2022 274 (H)     Hemoglobin A1C (%)   Date Value   07/28/2023 8.3 (A)   07/25/2022 7.7 (A)   03/14/2022 8.8 (A)   12/21/2020 8.8 (H)     Bicarbonate (mmol/L)   Date Value   09/08/2023 27   07/25/2022 29   03/14/2022 28     Urea Nitrogen (mg/dL)   Date Value   09/08/2023 13   07/25/2022 13   03/14/2022 15     Creatinine (mg/dL)   Date Value   09/08/2023 1.38 (H)   07/25/2022 1.23   03/14/2022 1.52 (H)     Health Maintenance:   Foot Exam: None  Eye Exam: August 2023, he has had 3 injections in each eye, follow up needs to be scheduled.   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 A1C 8.3% July 2023 and 8.3% in office today.     Discussed that with retinopathy and obesity he is at high risk for CV complications.     RX changes:   STOP Januvia 25 daily  START Mounjaro 2.5 mg weekly for 4-8 weeks as tolerated with increase to 5 mg weekly.   START Tracey 3. Sensor initiated in office today and linked to practice. Patient instructed on use, care, when to check glucose with meter, when to call for support.   CONTINUE Metformin 500 mg 2 tablets twice daily  CONTINUE Farxiga 10 mg daily  Education:   interpretation of lab results, blood sugar goals, and complications of diabetes mellitus  Sleep Apnea: Instructed to use mask nightly.  Hypertension: Elevated at today's visit. Ordered BP cuff and instructed to send BP readings in to nephrologist for medication titration.  Hyperlipidemia: Recommend higher dose statin.   Retinopathy: discussed link to hyperglycemia and urgency in glucose control.   Proteinuria: Discussed urgency in glucose control. Appropriately on SGLT2i.   Follow up: I recommend diabetes care be with Pharm D in 5 weeks and myself in 3 months

## 2023-11-30 ENCOUNTER — OFFICE VISIT (OUTPATIENT)
Dept: ORTHOPEDIC SURGERY | Facility: HOSPITAL | Age: 58
End: 2023-11-30
Payer: COMMERCIAL

## 2023-11-30 ENCOUNTER — HOSPITAL ENCOUNTER (OUTPATIENT)
Dept: RADIOLOGY | Facility: HOSPITAL | Age: 58
Discharge: HOME | End: 2023-11-30
Payer: COMMERCIAL

## 2023-11-30 DIAGNOSIS — M25.812 IMPINGEMENT OF LEFT SHOULDER: Primary | ICD-10-CM

## 2023-11-30 DIAGNOSIS — M25.512 ACUTE PAIN OF LEFT SHOULDER: ICD-10-CM

## 2023-11-30 DIAGNOSIS — E78.2 HYPERLIPEMIA, MIXED: ICD-10-CM

## 2023-11-30 DIAGNOSIS — E11.9 TYPE 2 DIABETES MELLITUS WITHOUT COMPLICATION, WITHOUT LONG-TERM CURRENT USE OF INSULIN (MULTI): ICD-10-CM

## 2023-11-30 DIAGNOSIS — I10 HYPERTENSION, UNCONTROLLED: ICD-10-CM

## 2023-11-30 PROCEDURE — 20611 DRAIN/INJ JOINT/BURSA W/US: CPT | Performed by: ORTHOPAEDIC SURGERY

## 2023-11-30 PROCEDURE — 3052F HG A1C>EQUAL 8.0%<EQUAL 9.0%: CPT | Performed by: ORTHOPAEDIC SURGERY

## 2023-11-30 PROCEDURE — 1036F TOBACCO NON-USER: CPT | Performed by: ORTHOPAEDIC SURGERY

## 2023-11-30 PROCEDURE — 3008F BODY MASS INDEX DOCD: CPT | Performed by: ORTHOPAEDIC SURGERY

## 2023-11-30 PROCEDURE — 4010F ACE/ARB THERAPY RXD/TAKEN: CPT | Performed by: ORTHOPAEDIC SURGERY

## 2023-11-30 PROCEDURE — 99204 OFFICE O/P NEW MOD 45 MIN: CPT | Performed by: ORTHOPAEDIC SURGERY

## 2023-11-30 PROCEDURE — 73030 X-RAY EXAM OF SHOULDER: CPT | Mod: LT,FY

## 2023-11-30 PROCEDURE — 3066F NEPHROPATHY DOC TX: CPT | Performed by: ORTHOPAEDIC SURGERY

## 2023-11-30 RX ORDER — TRIAMCINOLONE ACET/0.9%NACL/PF 40 MG/ML
40 VIAL (ML) INJECTION ONCE
Status: SHIPPED | OUTPATIENT
Start: 2023-11-30

## 2023-11-30 ASSESSMENT — ENCOUNTER SYMPTOMS
SHORTNESS OF BREATH: 0
FEVER: 0
ARTHRALGIAS: 1
WHEEZING: 0
CHILLS: 0
FATIGUE: 0

## 2023-11-30 ASSESSMENT — PAIN SCALES - GENERAL: PAINLEVEL_OUTOF10: 7

## 2023-11-30 ASSESSMENT — PAIN - FUNCTIONAL ASSESSMENT: PAIN_FUNCTIONAL_ASSESSMENT: 0-10

## 2023-11-30 NOTE — PROGRESS NOTES
Reason for Appointment  Chief Complaint   Patient presents with    Left Shoulder - Pain     History of Present Illness  New patient is a 58 y.o. male here today for evaluation of left shoulder pain. X-rays today show minimal arthritic change in the shoulder. He has been through some therapy for the shoulder and taken antiinflammatories. He does have a history of a fractured collar bone and  shoulder on the right from playing football. Pain in the left shoulder bothers him at night and is worse with overhead activities and lifting. No radicular symptoms or numbness. He has DM Type 2 and is working on getting his A1C down.     Past Medical History:   Diagnosis Date    Elevated blood-pressure reading, without diagnosis of hypertension 07/25/2022    Blood pressure elevated without history of HTN    Personal history of other endocrine, nutritional and metabolic disease     History of high cholesterol    Type 2 diabetes mellitus without complications (CMS/[facility]) 02/18/2022    Diabetes mellitus type 2 without retinopathy       Past Surgical History:   Procedure Laterality Date    OTHER SURGICAL HISTORY  03/14/2022    Hip surgery       Medication Documentation Review Audit       Reviewed by Susi Bliss MA (Medical Assistant) on 11/30/23 at 0747      Medication Order Taking? Sig Documenting Provider Last Dose Status   amLODIPine (Norvasc) 10 mg tablet [id number] Yes Take 1 tablet (10 mg) by mouth once daily. Richard Key MD Taking Active   atorvastatin (Lipitor) 40 mg tablet [id number] Yes Take 1 tablet (40 mg) by mouth once daily. Richard Key MD Taking Active   blood pressure test kit-large kit [id number] Yes 1 each once daily. DARA Aguilar Taking Active   blood-glucose sensor (FreeStyle Tracey 3 Sensor) device [id number] Yes 1 each every 14 (fourteen) days. DARA Aguilar Taking Active   dapagliflozin propanediol (Farxiga) 10 mg [id number] Yes Take 1 tablet (10 mg) by mouth  once daily. Suki Bosch MD Taking Active   losartan (Cozaar) 25 mg tablet [id number] Yes Take 1 tablet (25 mg) by mouth once daily. Richard Key MD Taking Active   metFORMIN (Glucophage) 500 mg tablet [id number] Yes Take 2 tablets (1,000 mg) by mouth 2 times a day. Richard Key MD Taking Active   SITagliptin phosphate (Januvia) 25 mg tablet [id number] Yes Take 1 tablet (25 mg) by mouth once daily. Richard Key MD Taking Active   tirzepatide (Mounjaro) 2.5 mg/0.5 mL pen injector [id number] Yes Inject 2.5 mg under the skin every 7 days. MARZENA Aguilar-CNP Taking Active                    Allergies   Allergen Reactions    Penicillins Other     Told since childhood.  Unsure of reaction    Gabapentin Nausea Only       Review of Systems   Constitutional:  Negative for chills, fatigue and fever.   Respiratory:  Negative for shortness of breath and wheezing.    Cardiovascular:  Negative for chest pain and leg swelling.   Musculoskeletal:  Positive for arthralgias.   All other systems reviewed and are negative.    Exam   On exam, there may be some more scapular prominence on the left, mild thoracic kyphosis, no cervical tenderness, no periscapular tenderness, good cervical ROM, no AC or SC joint tenderness, lacks about 15 degrees of forward flexion on the left, symmetric external rotation, excellent rotator cuff strength, slightly increased weakness in the infraspinatus, good internal rotation strength, markedly positive impingement sign on the left, no significant biceps or joint line tenderness, good biceps and triceps strength, good wrist flexion and extension strength, no hyperreflexia, negative Twyla's sign, good pulses, good sensation, good cap refill, no skin changes    Assessment   Encounter Diagnoses   Name Primary?    Hypertension, uncontrolled     Hyperlipemia, mixed     Type 2 diabetes mellitus without complication, without long-term current use of insulin (CMS/[facility])      Impingement of left shoulder Yes     Plan   Today we discussed conservative treatment. At this point, the patient is experiencing increased left shoulder pain that is consistent with impingement syndrome on clinical examination with positive impingement signs. We will do one cortisone injection into the left subacromial space in hopes to calm their symptoms nicely. Pt understands the small risk of infection and warning signs including flare reaction. He understands the risk of glucose elevation following a cortisone injection. Patient will follow up in eight weeks for reevaluation. I would like him to get an X-ray today of the shoulder on his way out.     Procedures  After discussing the risks and benefits of the procedure with proceeded with an injection.  Using ultrasound guidance we identified the acromion, humeral head and the subacromial bursa, images obtained. We then sterilely injected the left subacrominal space with a mixture of 40 mg of Kenalog and 1 cc of 1 % lidocaine. Pt tolerated the procedure well without any adverse reactions.    X-rays did not show any severe arthritis as he has had hip arthritis in the past.  Hopefully 1 injection will calm this down.  Will check his rotator cuff strength back in a couple of months we did talk about the possibility future surgery such as decompression    I, Petra Smith, attest that this documentation has been prepared under the direction and in the presence of Vern Billy MD. By signing below, I, Vern Billy MD, personally performed the services described in this documentation. All medical record entries made by the scribe were at my direction and in my presence. I have reviewed the chart and agree that the record reflects my personal performance and is accurate and complete. 11/30/23

## 2023-12-19 ENCOUNTER — APPOINTMENT (OUTPATIENT)
Dept: ENDOCRINOLOGY | Facility: CLINIC | Age: 58
End: 2023-12-19
Payer: COMMERCIAL

## 2023-12-28 DIAGNOSIS — N18.2 CKD (CHRONIC KIDNEY DISEASE) STAGE 2, GFR 60-89 ML/MIN: ICD-10-CM

## 2023-12-29 RX ORDER — DAPAGLIFLOZIN 10 MG/1
10 TABLET, FILM COATED ORAL DAILY
Qty: 6 TABLET | Refills: 0 | Status: SHIPPED | OUTPATIENT
Start: 2023-12-29 | End: 2024-02-07 | Stop reason: WASHOUT

## 2024-01-16 ENCOUNTER — APPOINTMENT (OUTPATIENT)
Dept: ENDOCRINOLOGY | Facility: CLINIC | Age: 59
End: 2024-01-16
Payer: COMMERCIAL

## 2024-02-06 ENCOUNTER — PATIENT MESSAGE (OUTPATIENT)
Dept: ENDOCRINOLOGY | Facility: CLINIC | Age: 59
End: 2024-02-06
Payer: COMMERCIAL

## 2024-02-06 DIAGNOSIS — E11.65 TYPE 2 DIABETES MELLITUS WITH HYPERGLYCEMIA, WITHOUT LONG-TERM CURRENT USE OF INSULIN (MULTI): Primary | ICD-10-CM

## 2024-02-08 ENCOUNTER — APPOINTMENT (OUTPATIENT)
Dept: ORTHOPEDIC SURGERY | Facility: HOSPITAL | Age: 59
End: 2024-02-08
Payer: COMMERCIAL

## 2024-02-22 ENCOUNTER — OFFICE VISIT (OUTPATIENT)
Dept: ORTHOPEDIC SURGERY | Facility: HOSPITAL | Age: 59
End: 2024-02-22
Payer: COMMERCIAL

## 2024-02-22 DIAGNOSIS — M25.812 IMPINGEMENT OF LEFT SHOULDER: Primary | ICD-10-CM

## 2024-02-22 PROCEDURE — 3008F BODY MASS INDEX DOCD: CPT | Performed by: ORTHOPAEDIC SURGERY

## 2024-02-22 PROCEDURE — 1036F TOBACCO NON-USER: CPT | Performed by: ORTHOPAEDIC SURGERY

## 2024-02-22 PROCEDURE — 99213 OFFICE O/P EST LOW 20 MIN: CPT | Performed by: ORTHOPAEDIC SURGERY

## 2024-02-22 ASSESSMENT — ENCOUNTER SYMPTOMS
FEVER: 0
RHINORRHEA: 0
COLOR CHANGE: 0
TROUBLE SWALLOWING: 0
FATIGUE: 0
WHEEZING: 0
SHORTNESS OF BREATH: 0
CHILLS: 0

## 2024-02-22 ASSESSMENT — PAIN SCALES - GENERAL: PAINLEVEL_OUTOF10: 5 - MODERATE PAIN

## 2024-02-22 ASSESSMENT — PAIN - FUNCTIONAL ASSESSMENT: PAIN_FUNCTIONAL_ASSESSMENT: 0-10

## 2024-02-22 NOTE — PROGRESS NOTES
Reason for Appointment  Improved L shoulder pain    History of Present Illness  Patient is a 58 y.o. male here today for follow-up evaluation of improved left shoulder pain.  He had a subacromial injection done 2 months ago that did give him significant relief.  He still has some mild posterior lateral shoulder pain with reaching behind him.  He has attended formal physical therapy in the past and has home exercises and bands to use.  Pain overall is much improved.  No other changes in his past medical history, allergies, or medications.    Past Medical History:   Diagnosis Date    Elevated blood-pressure reading, without diagnosis of hypertension 2022    Blood pressure elevated without history of HTN    Personal history of other endocrine, nutritional and metabolic disease     History of high cholesterol    Type 2 diabetes mellitus without complications (CMS/[facility]) 2022    Diabetes mellitus type 2 without retinopathy       Past Surgical History:   Procedure Laterality Date    OTHER SURGICAL HISTORY  2022    Hip surgery       Medication Documentation Review Audit       Reviewed by Susi Bliss MA (Medical Assistant) on 24 at 0807      Medication Order Taking? Sig Documenting Provider Last Dose Status   amLODIPine (Norvasc) 10 mg tablet [id number] Yes Take 1 tablet (10 mg) by mouth once daily. Richard Key MD Taking Active   atorvastatin (Lipitor) 40 mg tablet [id number] Yes Take 1 tablet (40 mg) by mouth once daily. Richard Key MD Taking Active   blood pressure test kit-large kit [id number] Yes 1 each once daily. DARA Aguilar Taking Active   blood-glucose sensor (FreeStyle Tracey 3 Sensor) device [id number] Yes 1 each every 14 (fourteen) days. DARA Aguilar Taking Active   losartan (Cozaar) 25 mg tablet [id number]  Take 1 tablet (25 mg) by mouth once daily. Richard Key MD   24 0765   metFORMIN (Glucophage) 500 mg tablet [id number] Yes  Take 2 tablets (1,000 mg) by mouth 2 times a day. Richard Key MD Taking Active   SITagliptin phosphate (Januvia) 25 mg tablet [id number] Yes Take 1 tablet (25 mg) by mouth once daily. Richard Key MD Taking Active   tirzepatide 5 mg/0.5 mL pen injector [id number] Yes Inject 5 mg under the skin every 7 days. Cheyanne Collins, APRN-CNP Taking Active   triamcinol ac (PF) in 0.9%NaCl (KENALOG) injection 40 mg [id number]   Vern Billy MD  Active                    Allergies   Allergen Reactions    Penicillins Other     Told since childhood.  Unsure of reaction    Gabapentin Nausea Only       Review of Systems   Constitutional:  Negative for chills, fatigue and fever.   HENT:  Negative for rhinorrhea and trouble swallowing.    Respiratory:  Negative for shortness of breath and wheezing.    Cardiovascular:  Negative for chest pain and leg swelling.   Skin:  Negative for color change and pallor.     Exam   On exam left shoulder shows good active forward flexion, he has good cuff strength with resisted external rotation and very mildly positive impingement signs today on the left.  Deltoid is functional.  No biceps tenderness anteriorly.  Good pulses and sensation in the upper extremity.    Assessment   Left shoulder impingement    Plan   He will work on home and stretching.  He understands proper lifting techniques and avoiding flareups with this shoulder.  We did discuss a possible repeat injection in the future if his symptoms return he can follow-up with us as needed.    Written by Aretha Carr PA-C

## 2024-02-23 ENCOUNTER — CLINICAL SUPPORT (OUTPATIENT)
Dept: ENDOCRINOLOGY | Facility: CLINIC | Age: 59
End: 2024-02-23
Payer: COMMERCIAL

## 2024-02-23 DIAGNOSIS — E11.65 TYPE 2 DIABETES MELLITUS WITH HYPERGLYCEMIA, WITHOUT LONG-TERM CURRENT USE OF INSULIN (MULTI): ICD-10-CM

## 2024-02-23 PROCEDURE — 99211 OFF/OP EST MAY X REQ PHY/QHP: CPT | Performed by: PHARMACIST

## 2024-02-23 NOTE — PROGRESS NOTES
Clinical Pharmacy Team met with Jose Angel Gutierrez regarding a consultation for diabetes management thanks to a referral from Cheyanne Collins DNP. Below is a summary of our conversation and recommendations:    Recommendations:  Continue all DM medications as prescribed  Get updated follow up labs  3. Patient should continue to use CGM to monitor glucose  ________________________________________________________________________      Allergies   Allergen Reactions    Penicillins Other     Told since childhood.  Unsure of reaction    Gabapentin Nausea Only       Objective     There were no vitals taken for this visit.    Diabetes Pharmacotherapy:    Mounjaro 5 mg subcutaneous once weekly  Metformin 500 mg two tablets twice daily      Lab Review  Lab Results   Component Value Date    BILITOT 0.7 09/08/2023    CALCIUM 9.5 09/08/2023    CO2 27 09/08/2023     09/08/2023    CREATININE 1.38 (H) 09/08/2023    GLUCOSE 299 (H) 09/08/2023    ALKPHOS 114 09/08/2023    K 5.0 09/08/2023    PROT 7.4 09/08/2023     09/08/2023    AST 23 09/08/2023    ALT 36 09/08/2023    BUN 13 09/08/2023    ANIONGAP 15 09/08/2023    ALBUMIN 4.3 09/08/2023    GFRMALE 60 09/08/2023     Lab Results   Component Value Date    CHOL 184 07/25/2022    HDL 28.8 (A) 07/25/2022     Lab Results   Component Value Date    HGBA1C 8.3 (A) 11/21/2023    HGBA1C 8.3 (A) 07/28/2023    HGBA1C 7.7 (A) 07/25/2022     The 10-year ASCVD risk score (Margarita CONNELL, et al., 2019) is: 25.9%    Values used to calculate the score:      Age: 58 years      Sex: Male      Is Non- : Yes      Diabetic: Yes      Tobacco smoker: No      Systolic Blood Pressure: 136 mmHg      Is BP treated: Yes      HDL Cholesterol: 28.8 mg/dL      Total Cholesterol: 184 mg/dL      Monitoring         Assessment/Plan     The patient reports today for a diabetes consultation. Reviewed CGM data and discussed it with the patient. TIR is at goal. Patient reports doing well on his  current regimen. Denies any N/V/D or GI distress on mounjaro. Given improvement in glycemic control recommend no  changes changes        PATIENT EDUCATION/GOALS      Goals  Fasting B - 130 mg/dL  Postprandial BG: less than 180 mg/dL  A1c: less than 7%    Type of encounter: [ in person/]    Provided counseling on lifestyle modifications, medications, and self-monitoring. Patient has no additional questions at this time. Pharmacy to follow up in as requested. Please reach out with any questions. Thank you.       Dewey Good, PharmD    Continue all meds under the continuation of care with the referring provider and clinical pharmacy team.

## 2024-02-28 ENCOUNTER — OFFICE VISIT (OUTPATIENT)
Dept: OPHTHALMOLOGY | Facility: CLINIC | Age: 59
End: 2024-02-28
Payer: COMMERCIAL

## 2024-02-28 DIAGNOSIS — E11.3313 MODERATE NONPROLIFERATIVE DIABETIC RETINOPATHY OF BOTH EYES WITH MACULAR EDEMA ASSOCIATED WITH TYPE 2 DIABETES MELLITUS (MULTI): Primary | ICD-10-CM

## 2024-02-28 DIAGNOSIS — E11.3299 NPDR (NONPROLIFERATIVE DIABETIC RETINOPATHY) (MULTI): ICD-10-CM

## 2024-02-28 PROCEDURE — 99213 OFFICE O/P EST LOW 20 MIN: CPT

## 2024-02-28 PROCEDURE — 92134 CPTRZ OPH DX IMG PST SGM RTA: CPT

## 2024-02-28 ASSESSMENT — CUP TO DISC RATIO
OS_RATIO: .7
OD_RATIO: .6

## 2024-02-28 ASSESSMENT — ENCOUNTER SYMPTOMS
ALLERGIC/IMMUNOLOGIC NEGATIVE: 0
MUSCULOSKELETAL NEGATIVE: 0
HEMATOLOGIC/LYMPHATIC NEGATIVE: 0
PSYCHIATRIC NEGATIVE: 0
GASTROINTESTINAL NEGATIVE: 0
NEUROLOGICAL NEGATIVE: 0
ENDOCRINE NEGATIVE: 0
RESPIRATORY NEGATIVE: 0
EYES NEGATIVE: 0
CONSTITUTIONAL NEGATIVE: 0
CARDIOVASCULAR NEGATIVE: 0

## 2024-02-28 ASSESSMENT — SLIT LAMP EXAM - LIDS
COMMENTS: GOOD POSITION
COMMENTS: GOOD POSITION

## 2024-02-28 ASSESSMENT — TONOMETRY
OD_IOP_MMHG: 16
OS_IOP_MMHG: 14
IOP_METHOD: TONOPEN

## 2024-02-28 ASSESSMENT — CONF VISUAL FIELD
OD_SUPERIOR_TEMPORAL_RESTRICTION: 0
OS_INFERIOR_NASAL_RESTRICTION: 0
OD_NORMAL: 1
OD_SUPERIOR_NASAL_RESTRICTION: 0
OS_INFERIOR_TEMPORAL_RESTRICTION: 0
OD_INFERIOR_NASAL_RESTRICTION: 0
OS_SUPERIOR_NASAL_RESTRICTION: 0
OS_SUPERIOR_TEMPORAL_RESTRICTION: 0
OD_INFERIOR_TEMPORAL_RESTRICTION: 0
METHOD: COUNTING FINGERS
OS_NORMAL: 1

## 2024-02-28 ASSESSMENT — EXTERNAL EXAM - RIGHT EYE: OD_EXAM: NORMAL

## 2024-02-28 ASSESSMENT — VISUAL ACUITY
METHOD: SNELLEN - LINEAR
OS_CC: 20/20
OD_CC: 20/30

## 2024-02-28 ASSESSMENT — EXTERNAL EXAM - LEFT EYE: OS_EXAM: NORMAL

## 2024-02-28 NOTE — PROGRESS NOTES
Moderate nonproliferative diabetic retinopathy with macular edema in type II DM, both eyesE11.5746  - Hx of Diabetes 3 years  - Most recent HbA1c = 6.3 (down from 8)  - Hx of HTN  - ? Hx of kidney disease    S/p IVV OU #5    - OCT 02/28/24   --- OD: Center involving DME with large turbid cysts.   --- OS: [facility] non Ci DME.    #Impression/Plan#:   - Patient lost to follow up; vision is stable.   - DME OD is persistent, non responsive to IVV; discussed options of continuing IVV vs switching to steroids.   - Discussed risks/benefits/alteranatives of treatment options. Patient elects to observe  - Emphasized the importance of blood glucose, BP, and cholestrol level control  - RTC in 2 months     Glaucoma suspect~H40.009   Off drops, following up with Dr. Mike

## 2024-02-29 ENCOUNTER — TELEMEDICINE CLINICAL SUPPORT (OUTPATIENT)
Dept: ENDOCRINOLOGY | Facility: CLINIC | Age: 59
End: 2024-02-29
Payer: COMMERCIAL

## 2024-02-29 VITALS — HEIGHT: 68 IN | WEIGHT: 222 LBS | BODY MASS INDEX: 33.65 KG/M2

## 2024-02-29 DIAGNOSIS — E11.9 TYPE 2 DIABETES MELLITUS WITHOUT COMPLICATION, WITHOUT LONG-TERM CURRENT USE OF INSULIN (MULTI): Primary | ICD-10-CM

## 2024-02-29 DIAGNOSIS — Z71.3 DIETARY COUNSELING: ICD-10-CM

## 2024-02-29 PROCEDURE — 97802 MEDICAL NUTRITION INDIV IN: CPT | Performed by: DIETITIAN, REGISTERED

## 2024-02-29 NOTE — PATIENT INSTRUCTIONS
Follow the healthy plate at meal times to assist with portion control as well as to provide well balanced meals.  Healthy Plate: Use a 9-inch diameter plate and aim for ½ plate non-starchy vegetables, ¼ plate lean protein, and ¼ plate complex carbohydrates.  Have a goal of consuming a lean source of protein at meals to assist in providing better satiety.   Aim for no more than 4 servings of healthy carbohydrate foods at meals (60 grams of carbs).   Can maintain Intermittent Fasting as you are doing. Most important is to listen to your body's hunger cues, not forcing a meal when not hungry, but also not allowing yourself to get to the point of feeling ravenous. Practice mindful eating as discussed.   Choose foods high in fiber such as whole grain breads, cereals, pasta, brown rice, fruits, and vegetables. These foods are digested more slowly, which helps to better control blood sugar levels.  Choose foods with less sugar and fats.   Maintain sugar-free, calorie-free beverages.   Aim to drink 64 ounces of water daily.  Keep up the great work with your weekly exercise, aiming for next goal of 200-250 minutes of moderate physical activity weekly.  Continue to check blood sugar levels regularly as discussed to determine correlation between food choices and amounts of blood sugar rise.

## 2024-02-29 NOTE — PROGRESS NOTES
"Initial Nutrition Assessment    Patient was referred to nutrition by Dewey Good PharmD for education on healthy eating for consideration of Type 2 DM. Other PMHX significant for HTN, HLD, ANDERSON, OA of the hip, and Depression. Pertinent labs reviewed which show A1c of 8.3% obtained in November of last year. Pt currently uses the Tracey for daily BG monitoring with data reviewed during visit.  14 day average BG noted of 128 mg/dL and TIR = 91%. Pt states he is actively trying to make better choices with foods and incorporate weekly exercise. Not feeling as hungry/snacking less since Mounjaro medication was started. Happy to report progress with weight loss and has decreased weight by 13 lbs since November of last year.     Diet recall reveals a consistent meal pattern with 2-3 meals daily and 0-2 snacks. Pt with recent implementation of a wider variety of healthy foods in better controlled portions which has contributed both to weight loss and better controlled BG levels. Fluids meeting recommendations in type and amount with water as primary beverage. Pt is incorporating consistent physical activity, meeting weekly recommendations. See all interventions/recommendations below as discussed during visit this day.      Patient reported symptoms: None    Anthropometrics:  Height:   Ht Readings from Last 1 Encounters:   11/21/23 1.727 m (5' 8\")      Weight:   Wt Readings from Last 10 Encounters:   11/21/23 107 kg (235 lb 9.6 oz)   11/09/23 108 kg (239 lb)   09/08/23 108 kg (237 lb 12.8 oz)   08/08/23 106 kg (232 lb 9.6 oz)   12/06/22 106 kg (233 lb 9.6 oz)   10/21/22 108 kg (238 lb)   07/25/22 107 kg (235 lb 9.6 oz)   03/28/22 108 kg (237 lb 3.2 oz)   03/14/22 107 kg (236 lb)      Current BMI:   BMI Readings from Last 1 Encounters:   11/21/23 35.82 kg/m²        Labs:  Lab Results   Component Value Date    HGBA1C 8.3 (A) 11/21/2023      Lab Results   Component Value Date    CHOL 184 07/25/2022    CHOL 184 03/14/2022     Lab " "Results   Component Value Date    HDL 28.8 (A) 07/25/2022    HDL 29.4 (A) 03/14/2022     No results found for: \"LDLCALC\"  No results found for: \"TRIG\"    Malnutrition Screening:  Significant Unintentional weight loss: No  Eating less than 75% of usual intake for more than 2 weeks: No  Potential Signs of Inflammation: No    Recommended Malnutrition Diagnosis: No malnutrition identified    Diet Recall-  Breakfast- skips due to IF some days OR has fruit (melon) OR occasional breakfast sandwich OR pancakes and syrup with eggs on a 'cheat day'  Lunch- deli sandwich (turkey OR ham) with cheese on wheat bread and a small bag of chips and a serving of fruit   Dinner- salmon OR chicken OR pork OR a small steak with various vegetables and starches OR occasional pizza   Daily Snacks- none OR small amount of nuts or pretzels or popcorn   Beverages- water throughout the day (6-8 bottles daily) and Diet Coke (2 servings)   Alcohol- sporadically by report  Physical Activity- 4 days a week does bike riding for 30-45 minutes at a time and twice weekly does weight lifting    Other pertinent patient reported Information:  - Not wanting to snack now as much since starting Mounjaro medication  - Please with 13 lbs weight loss to date.    Nutrition Diagnosis: Food and nutrition-related knowledge deficit related to lack of prior exposure to information as evidenced by verbalizes incomplete information.    Readiness to Learn:  Cognitive ability: Alert and oriented  Motivation to learn: Interested  Family Support: Unable to assess- family not present  Instruction provided to: Patient  Patient learns best by: Multiple methods  Factors affecting learning: None   Physical limitations affecting learning: None    Education Materials Provided:   Carbohydrate Counting for Diabetes    Nutrition Interventions/Recommendations for 2/29/2024:  Follow the healthy plate at meal times to assist with portion control as well as to provide well balanced " meals.  Healthy Plate: Use a 9-inch diameter plate and aim for ½ plate non-starchy vegetables, ¼ plate lean protein, and ¼ plate complex carbohydrates.  Have a goal of consuming a lean source of protein at meals to assist in providing better satiety.   Aim for no more than 4 servings of healthy carbohydrate foods at meals (60 grams of carbs).   Can maintain Intermittent Fasting as you are doing. Most important is to listen to your body's hunger cues, not forcing a meal when not hungry, but also not allowing yourself to get to the point of feeling ravenous. Practice mindful eating as discussed.   Choose foods high in fiber such as whole grain breads, cereals, pasta, brown rice, fruits, and vegetables. These foods are digested more slowly, which helps to better control blood sugar levels.  Choose foods with less sugar and fats.   Maintain sugar-free, calorie-free beverages.   Aim to drink 64 ounces of water daily.  Keep up the great work with your weekly exercise, aiming for next goal of 200-250 minutes of moderate physical activity weekly.  Continue to check blood sugar levels regularly as discussed to determine correlation between food choices and amounts of blood sugar rise.      Nutrition Monitoring & Evaluation: Reduction in A1c with goal of 7% A1c or less and adherence to recommendations and patient stated goals    Need for follow-up:  April as scheduled    Referred by: Dewey EscalonaD    MNT Billing Type: Medical Nutrition Assessment, each 15 min increment, for 3 increments.    SIGNATURE:   Leticia Gonzalez RD, LD, CDCES                                                             DATE:   2/29/2024

## 2024-03-06 DIAGNOSIS — N18.2 CKD (CHRONIC KIDNEY DISEASE) STAGE 2, GFR 60-89 ML/MIN: Primary | ICD-10-CM

## 2024-03-06 RX ORDER — DAPAGLIFLOZIN 10 MG/1
10 TABLET, FILM COATED ORAL DAILY
Qty: 90 TABLET | Refills: 3 | Status: SHIPPED | OUTPATIENT
Start: 2024-03-06 | End: 2024-03-25 | Stop reason: SDUPTHER

## 2024-03-08 ENCOUNTER — LAB (OUTPATIENT)
Dept: LAB | Facility: LAB | Age: 59
End: 2024-03-08
Payer: COMMERCIAL

## 2024-03-08 DIAGNOSIS — N18.2 CKD (CHRONIC KIDNEY DISEASE) STAGE 2, GFR 60-89 ML/MIN: ICD-10-CM

## 2024-03-08 LAB
25(OH)D3 SERPL-MCNC: 23 NG/ML (ref 30–100)
ALBUMIN SERPL BCP-MCNC: 4.6 G/DL (ref 3.4–5)
ANION GAP SERPL CALC-SCNC: 12 MMOL/L (ref 10–20)
BUN SERPL-MCNC: 17 MG/DL (ref 6–23)
CALCIUM SERPL-MCNC: 9.8 MG/DL (ref 8.6–10.6)
CHLORIDE SERPL-SCNC: 99 MMOL/L (ref 98–107)
CO2 SERPL-SCNC: 30 MMOL/L (ref 21–32)
CREAT SERPL-MCNC: 1.16 MG/DL (ref 0.5–1.3)
CREAT UR-MCNC: 88.5 MG/DL (ref 20–370)
EGFRCR SERPLBLD CKD-EPI 2021: 74 ML/MIN/1.73M*2
GLUCOSE SERPL-MCNC: 127 MG/DL (ref 74–99)
MICROALBUMIN UR-MCNC: 149.1 MG/L
MICROALBUMIN/CREAT UR: 168.5 UG/MG CREAT
PHOSPHATE SERPL-MCNC: 3.4 MG/DL (ref 2.5–4.9)
POTASSIUM SERPL-SCNC: 4.9 MMOL/L (ref 3.5–5.3)
PTH-INTACT SERPL-MCNC: 57.1 PG/ML (ref 18.5–88)
SODIUM SERPL-SCNC: 136 MMOL/L (ref 136–145)

## 2024-03-08 PROCEDURE — 80069 RENAL FUNCTION PANEL: CPT

## 2024-03-08 PROCEDURE — 36415 COLL VENOUS BLD VENIPUNCTURE: CPT

## 2024-03-08 PROCEDURE — 83970 ASSAY OF PARATHORMONE: CPT

## 2024-03-08 PROCEDURE — 82570 ASSAY OF URINE CREATININE: CPT

## 2024-03-08 PROCEDURE — 82306 VITAMIN D 25 HYDROXY: CPT

## 2024-03-08 PROCEDURE — 82043 UR ALBUMIN QUANTITATIVE: CPT

## 2024-03-20 ENCOUNTER — TELEPHONE (OUTPATIENT)
Dept: NEPHROLOGY | Facility: CLINIC | Age: 59
End: 2024-03-20
Payer: COMMERCIAL

## 2024-03-20 NOTE — TELEPHONE ENCOUNTER
"----- Message from Nuria Go MA sent at 3/20/2024 12:13 PM EDT -----  Regarding: FW: Schedule   Contact: [phone]    ----- Message -----  From: Jose Angel Gutierrez Jr. \"TJ\"  Sent: 3/20/2024  12:11 PM EDT  To: [id number] Clinical Support Staff  Subject: Schedule                                         I believe I need an appointment with you.      "

## 2024-03-20 NOTE — TELEPHONE ENCOUNTER
Patient said that he just saw Dr. Hughes's name in his MyChart but is already seeing another nephrologist, so chose not to schedule at this time.

## 2024-03-25 ENCOUNTER — OFFICE VISIT (OUTPATIENT)
Dept: ENDOCRINOLOGY | Facility: CLINIC | Age: 59
End: 2024-03-25
Payer: COMMERCIAL

## 2024-03-25 VITALS
BODY MASS INDEX: 34.21 KG/M2 | DIASTOLIC BLOOD PRESSURE: 76 MMHG | TEMPERATURE: 96.8 F | SYSTOLIC BLOOD PRESSURE: 117 MMHG | WEIGHT: 225 LBS | RESPIRATION RATE: 16 BRPM | HEART RATE: 84 BPM

## 2024-03-25 DIAGNOSIS — G47.33 OBSTRUCTIVE SLEEP APNEA: ICD-10-CM

## 2024-03-25 DIAGNOSIS — I10 HYPERTENSION, UNCONTROLLED: ICD-10-CM

## 2024-03-25 DIAGNOSIS — E11.9 TYPE 2 DIABETES MELLITUS WITHOUT COMPLICATION, WITHOUT LONG-TERM CURRENT USE OF INSULIN (MULTI): ICD-10-CM

## 2024-03-25 DIAGNOSIS — N18.2 CKD (CHRONIC KIDNEY DISEASE) STAGE 2, GFR 60-89 ML/MIN: ICD-10-CM

## 2024-03-25 DIAGNOSIS — E11.3213 BOTH EYES AFFECTED BY MILD NONPROLIFERATIVE DIABETIC RETINOPATHY WITH MACULAR EDEMA, ASSOCIATED WITH TYPE 2 DIABETES MELLITUS (MULTI): ICD-10-CM

## 2024-03-25 DIAGNOSIS — I10 PRIMARY HYPERTENSION: ICD-10-CM

## 2024-03-25 DIAGNOSIS — E11.65 TYPE 2 DIABETES MELLITUS WITH HYPERGLYCEMIA, WITHOUT LONG-TERM CURRENT USE OF INSULIN (MULTI): Primary | ICD-10-CM

## 2024-03-25 DIAGNOSIS — E78.2 HYPERLIPEMIA, MIXED: ICD-10-CM

## 2024-03-25 LAB — POC HEMOGLOBIN A1C: 5.8 % (ref 4.2–6.5)

## 2024-03-25 PROCEDURE — 4010F ACE/ARB THERAPY RXD/TAKEN: CPT | Performed by: NURSE PRACTITIONER

## 2024-03-25 PROCEDURE — 95251 CONT GLUC MNTR ANALYSIS I&R: CPT | Performed by: NURSE PRACTITIONER

## 2024-03-25 PROCEDURE — 3060F POS MICROALBUMINURIA REV: CPT | Performed by: NURSE PRACTITIONER

## 2024-03-25 PROCEDURE — 1036F TOBACCO NON-USER: CPT | Performed by: NURSE PRACTITIONER

## 2024-03-25 PROCEDURE — 3008F BODY MASS INDEX DOCD: CPT | Performed by: NURSE PRACTITIONER

## 2024-03-25 PROCEDURE — 3078F DIAST BP <80 MM HG: CPT | Performed by: NURSE PRACTITIONER

## 2024-03-25 PROCEDURE — 3074F SYST BP LT 130 MM HG: CPT | Performed by: NURSE PRACTITIONER

## 2024-03-25 PROCEDURE — 99214 OFFICE O/P EST MOD 30 MIN: CPT | Performed by: NURSE PRACTITIONER

## 2024-03-25 PROCEDURE — 83036 HEMOGLOBIN GLYCOSYLATED A1C: CPT | Performed by: NURSE PRACTITIONER

## 2024-03-25 RX ORDER — TIRZEPATIDE 7.5 MG/.5ML
7.5 INJECTION, SOLUTION SUBCUTANEOUS
Qty: 2 ML | Refills: 11 | Status: SHIPPED | OUTPATIENT
Start: 2024-03-25

## 2024-03-25 RX ORDER — DAPAGLIFLOZIN 10 MG/1
10 TABLET, FILM COATED ORAL DAILY
Qty: 90 TABLET | Refills: 3 | Status: SHIPPED | OUTPATIENT
Start: 2024-03-25 | End: 2025-03-25

## 2024-03-25 RX ORDER — BLOOD-GLUCOSE SENSOR
1 EACH MISCELLANEOUS
Qty: 6 EACH | Refills: 3 | Status: SHIPPED | OUTPATIENT
Start: 2024-03-25

## 2024-03-25 RX ORDER — LOSARTAN POTASSIUM 25 MG/1
25 TABLET ORAL DAILY
Qty: 90 TABLET | Refills: 3 | Status: SHIPPED | OUTPATIENT
Start: 2024-03-25

## 2024-03-25 RX ORDER — METFORMIN HYDROCHLORIDE 500 MG/1
1000 TABLET ORAL 2 TIMES DAILY
Qty: 360 TABLET | Refills: 3 | Status: SHIPPED | OUTPATIENT
Start: 2024-03-25

## 2024-03-25 ASSESSMENT — ENCOUNTER SYMPTOMS
PALPITATIONS: 0
NERVOUS/ANXIOUS: 0
NUMBNESS: 0
FREQUENCY: 0
POLYDIPSIA: 0
ACTIVITY CHANGE: 0
SEIZURES: 0
FATIGUE: 0
CONSTIPATION: 0
SHORTNESS OF BREATH: 0
POLYPHAGIA: 0
SLEEP DISTURBANCE: 0
NAUSEA: 0
DIZZINESS: 0
WEAKNESS: 0
APPETITE CHANGE: 0
DIARRHEA: 0

## 2024-03-25 ASSESSMENT — PAIN SCALES - GENERAL: PAINLEVEL: 0-NO PAIN

## 2024-03-25 NOTE — PATIENT INSTRUCTIONS
Type 2 diabetes mellitus, is at goal with a GMI 6.5% at today visit.     RX changes:   START Mounjaro 7.5 mg weekly. Rx sent for 5 mg as well du to shortage. Please fill higher dose as often as you can.   CONTINUE Metformin 500 mg 2 tablets twice daily  CONTINUE Farxiga 10 mg daily  CONTINUE COLETTE 3  Education:  interpretation of lab results, blood sugar goals, and complications of diabetes mellitus  Sleep Apnea: Instructed to use mask nightly.  Hypertension: At goal. No changes.   Hyperlipidemia: Ratio 6.4. He is taking statin daily.   Retinopathy: discussed link to hyperglycemia and urgency in glucose control.   Proteinuria: Discussed urgency in glucose control. Appropriately on SGLT2i. GFR 74.   BMI 34: We have increased mounjaro and he is working with a dietitian.   Follow up: I recommend diabetes care be in 6 months.

## 2024-03-25 NOTE — PROGRESS NOTES
Subjective   DENNIS Gutierrez Jr. is a 58 y.o. male who presents for follow up of Type 2 diabetes mellitus. The initial diagnosis of diabetes was made  in 2014 . The patient does not have a known family history of type 2 diabetes.    Known complications due to diabetes included retinopathy and proteinuria    Cardiovascular risk factors include advanced age (older than 55 for men, 65 for women), diabetes mellitus, dyslipidemia, hypertension, male gender, microalbuminuria, and obesity (BMI >= 30 kg/m2). The patient is on an ACE inhibitor or angiotensin II receptor blocker.      Current diabetes regimen is as follows:   Mounjaro 5 mg weekly   Metformin 500 mg 2 tablets twice daily  Farxiga 10 mg daily    The patient is currently checking the blood glucose using Tracey 3 continuous glucose monitor.     Hypoglycemia frequency: none  Hypoglycemia awareness: Yes     Exercise: every other day   Meal panning: He is using no plan.    Review of Systems   Constitutional:  Negative for activity change, appetite change and fatigue.   Respiratory:  Negative for shortness of breath.    Cardiovascular:  Negative for chest pain, palpitations and leg swelling.   Gastrointestinal:  Negative for constipation, diarrhea and nausea.   Endocrine: Negative for cold intolerance, heat intolerance, polydipsia, polyphagia and polyuria.   Genitourinary:  Negative for frequency.   Musculoskeletal:  Negative for gait problem.   Skin:  Negative for rash.   Neurological:  Negative for dizziness, seizures, weakness and numbness.   Psychiatric/Behavioral:  Negative for sleep disturbance and suicidal ideas. The patient is not nervous/anxious.        Objective   /76   Pulse 84   Temp 36 °C (96.8 °F)   Resp 16   Wt 102 kg (225 lb)   BMI 34.21 kg/m²   Physical Exam  Constitutional:       Appearance: He is obese.   Pulmonary:      Effort: Pulmonary effort is normal.   Skin:     General: Skin is warm and dry.   Neurological:      Mental Status: He is  alert and oriented to person, place, and time.   Psychiatric:         Mood and Affect: Mood normal.         Behavior: Behavior normal.         Thought Content: Thought content normal.         Judgment: Judgment normal.       Lab Review  Glucose (mg/dL)   Date Value   03/08/2024 127 (H)   09/08/2023 299 (H)   07/25/2022 264 (H)   03/14/2022 274 (H)     POC HEMOGLOBIN A1c (%)   Date Value   11/21/2023 8.3 (A)     Hemoglobin A1C (%)   Date Value   07/28/2023 8.3 (A)   07/25/2022 7.7 (A)   03/14/2022 8.8 (A)   12/21/2020 8.8 (H)     Bicarbonate (mmol/L)   Date Value   03/08/2024 30   09/08/2023 27   07/25/2022 29   03/14/2022 28     Urea Nitrogen (mg/dL)   Date Value   03/08/2024 17   09/08/2023 13   07/25/2022 13   03/14/2022 15     Creatinine (mg/dL)   Date Value   03/08/2024 1.16   09/08/2023 1.38 (H)   07/25/2022 1.23   03/14/2022 1.52 (H)         Downloaded and interrogated Tracey CGM:   Average Glucose 132 mg/dL  Glucose Management Indicator (GMI) 6.5%  Glucose Variability 21.7%  Time in Range 94%    Health Maintenance:   Foot Exam: None  Eye Exam: March 2024, he has had 3 injections in each eye. Right may need another injection.   Lipid: Ratio 6.4, HDL 28.8 July 2023  Urine Albumin: 264 with alb/Creat ratio 161.5 July 2023 He is seeing nephrology.     Assessment/Plan   Diagnoses and all orders for this visit:  Type 2 diabetes mellitus with hyperglycemia, without long-term current use of insulin (CMS/[facility])  Obstructive sleep apnea  Hypertension, uncontrolled  Hyperlipemia, mixed  Both eyes affected by mild nonproliferative diabetic retinopathy with macular edema, associated with type 2 diabetes mellitus (CMS/[facility])    Type 2 diabetes mellitus, is at goal with a GMI 6.5% at today visit and A1c 5.8%    RX changes:   START Mounjaro 7.5 mg weekly. Rx sent for 5 mg as well du to shortage. Please fill higher dose as often as you can.   CONTINUE Metformin 500 mg 2 tablets twice daily  CONTINUE Farxiga 10 mg daily  CONTINUE  COLETTE 3  Education:  interpretation of lab results, blood sugar goals, and complications of diabetes mellitus  Sleep Apnea: Instructed to use mask nightly.  Hypertension: At goal. No changes.   Hyperlipidemia: Ratio 6.4. He is taking statin daily.   Retinopathy: discussed link to hyperglycemia and urgency in glucose control.   Proteinuria: Discussed urgency in glucose control. Appropriately on SGLT2i. GFR 74.   BMI 34: We have increased mounjaro and he is working with a dietitian.   Follow up: I recommend diabetes care be in 6 months.

## 2024-04-04 ENCOUNTER — OFFICE VISIT (OUTPATIENT)
Dept: PRIMARY CARE | Facility: HOSPITAL | Age: 59
End: 2024-04-04
Payer: COMMERCIAL

## 2024-04-04 VITALS
DIASTOLIC BLOOD PRESSURE: 70 MMHG | TEMPERATURE: 97.3 F | HEART RATE: 88 BPM | HEIGHT: 68 IN | SYSTOLIC BLOOD PRESSURE: 124 MMHG | WEIGHT: 228.9 LBS | OXYGEN SATURATION: 99 % | BODY MASS INDEX: 34.69 KG/M2

## 2024-04-04 DIAGNOSIS — Z00.00 ROUTINE HEALTH MAINTENANCE: Primary | ICD-10-CM

## 2024-04-04 DIAGNOSIS — E11.21 TYPE II DIABETES MELLITUS WITH NEPHROPATHY (MULTI): ICD-10-CM

## 2024-04-04 DIAGNOSIS — E78.5 HYPERLIPIDEMIA, UNSPECIFIED HYPERLIPIDEMIA TYPE: ICD-10-CM

## 2024-04-04 DIAGNOSIS — N40.0 BENIGN PROSTATIC HYPERPLASIA WITHOUT LOWER URINARY TRACT SYMPTOMS: ICD-10-CM

## 2024-04-04 DIAGNOSIS — I10 HYPERTENSION, UNCONTROLLED: ICD-10-CM

## 2024-04-04 PROBLEM — J30.2 SEASONAL ALLERGIES: Status: ACTIVE | Noted: 2024-04-04

## 2024-04-04 PROBLEM — Z86.0100 PERSONAL HISTORY OF COLONIC POLYPS: Status: ACTIVE | Noted: 2024-04-04

## 2024-04-04 PROBLEM — Z86.010 PERSONAL HISTORY OF COLONIC POLYPS: Status: ACTIVE | Noted: 2024-04-04

## 2024-04-04 PROBLEM — N18.2 CKD (CHRONIC KIDNEY DISEASE), STAGE II: Status: ACTIVE | Noted: 2024-04-04

## 2024-04-04 PROBLEM — R93.1 AGATSTON CORONARY ARTERY CALCIUM SCORE LESS THAN 100: Status: ACTIVE | Noted: 2023-10-09

## 2024-04-04 PROCEDURE — 3074F SYST BP LT 130 MM HG: CPT | Performed by: INTERNAL MEDICINE

## 2024-04-04 PROCEDURE — 3078F DIAST BP <80 MM HG: CPT | Performed by: INTERNAL MEDICINE

## 2024-04-04 PROCEDURE — 4010F ACE/ARB THERAPY RXD/TAKEN: CPT | Performed by: INTERNAL MEDICINE

## 2024-04-04 PROCEDURE — 99396 PREV VISIT EST AGE 40-64: CPT | Performed by: INTERNAL MEDICINE

## 2024-04-04 PROCEDURE — 3008F BODY MASS INDEX DOCD: CPT | Performed by: INTERNAL MEDICINE

## 2024-04-04 PROCEDURE — 1036F TOBACCO NON-USER: CPT | Performed by: INTERNAL MEDICINE

## 2024-04-04 PROCEDURE — 3060F POS MICROALBUMINURIA REV: CPT | Performed by: INTERNAL MEDICINE

## 2024-04-04 ASSESSMENT — ENCOUNTER SYMPTOMS
DEPRESSION: 0
LOSS OF SENSATION IN FEET: 0
OCCASIONAL FEELINGS OF UNSTEADINESS: 0

## 2024-04-04 NOTE — ASSESSMENT & PLAN NOTE
Kidney function has improved on treatment with Mounjaro and Farxiga.  TJ will follow-up with the nephrologist again in 6 months.

## 2024-04-04 NOTE — ASSESSMENT & PLAN NOTE
Blood pressure is in a good range.  Diabetes has come under much better control.  Lipids are due now and will be assessed with his next routine blood work in June or July.  PSA will be updated with blood work in June or July.  Colon cancer screening is up-to-date.  We discussed vaccines, and I recommended a Tdap, PCV 20 and shingles vaccines.  He is going to think it over and update vaccines at his next visit.

## 2024-04-04 NOTE — PATIENT INSTRUCTIONS
Vaccines:  Tetanus, pneumonia (PCV20), shingles     Have PSA and lipid profile with lab work in June or July     Use CPAP nightly     5 servings of fruits and veg per day, 35 grams of fiber   Avoid: red meat, processed meats (deli and cured), saturated fat, sugar     Movies Netflix :  The Game Changers  Blue Zones   What the Health  [place] [employer]    Web Sites/Recipes:  Blue Zones  [place] [employer] [place]  Plant [employer]   Nutritionfacts.org     Authors:  Dr. Dheeraj Marks     Cookbooks:  The Get Healthy, Go Vegan Cookbook: 125 Easy and Delicious Recipes to Jump-Start Weight Loss and Help you Feel Great - Dr. Dheeraj Ivy's Cookbook for Reversing Diabetes:  150 Recipes Scientifically Proven to Reverse Diabetes Without Drugs - Dr. Dheeraj Ivy  The Prevent and Reverse Heart Disease Cookbook - Dr. Alley Grove   The [employer] Study All-Star Collection:  Whole Food, Plant-Based Recipes from Your Favorite Vegan  - Dr. LUAN Dior  How Not To Die - Dr. Juaquin Arnett

## 2024-04-04 NOTE — ASSESSMENT & PLAN NOTE
Hemoglobin A1c has improved significantly.  Eye exam is up-to-date.  Foot exam was completed in the office today.  No calluses or skin breakdown.  Mild tinea between the toes.  Daily clotrimazole cream was recommended.  We discussed that he can improve insulin sensitivity by changing diet including increasing intake of fruits and vegetables and high-fiber foods and limiting intake of red meat, processed meats, saturated fat sugar and processed grains.  He was given printed materials and directed to online resources to help with meal planning.

## 2024-04-04 NOTE — PROGRESS NOTES
"Chief Complaint   Patient presents with    [facility]         History Of Present Illness:    Jose Angel Gutierrez Jr. \"TJ\" is a 58 y.o. male presenting to [facility] and update health maintenance.  DENNIS has a history of poorly controlled diabetes complicated by mild nephropathy.  He met with an endocrinologist over the past year and started treatment with Mounjaro and Farxiga.  His hemoglobin A1c dropped from 8.3 on 11/21/23 to 5.8 on 3/25/2024.  His weight is also down.  He has a history of mild renal insufficiency and was evaluated by nephrologist.  His recent creatinine returned to baseline.  He keeps up with a routine eye exam but has not had a recent foot exam.  He completed a coronary artery calcium score on 10/9/2023.  His score was 15.  He has a history of bilateral hip replacements due to injuries in his early adult life.  He may have had hip dysplasia as a child.  He is no longer able to run, but he rides a bike for exercise.  He denies chest pain or shortness of breath with exertion.  He denies any change in bowel or bladder habits.  He has a history of obstructive sleep apnea and uses a CPAP on a nightly basis.      Active Medical Problems:  Patient Active Problem List    Diagnosis Date Noted    Seasonal allergies 04/04/2024    CKD (chronic kidney disease), stage II 04/04/2024    Personal history of colonic polyps 04/04/2024    Moderate nonproliferative diabetic retinopathy of both eyes with macular edema associated with type 2 diabetes mellitus (CMS/[facility]) 02/28/2024    Elevated serum creatinine 11/09/2023    Erectile dysfunction 11/09/2023    Glaucoma suspect 11/09/2023    Hypertensive retinopathy 11/09/2023    NPDR (nonproliferative diabetic retinopathy) (CMS/[facility]) 11/09/2023    Osteoarthritis of right hip 11/09/2023    Skin cyst 11/09/2023    Testosterone deficiency 11/09/2023    Tubulovillous adenoma polyp of colon 11/09/2023    Agatston coronary artery calcium score less than 100 10/09/2023    " Routine health maintenance 09/08/2023    Follow-up exam 08/08/2023    Type II diabetes mellitus with nephropathy (CMS/[facility]) 08/08/2023    Class 2 severe obesity due to excess calories with serious comorbidity and body mass index (BMI) of 36.0 to 36.9 in adult (CMS/[facility]) 08/08/2023    Primary hypertension 08/08/2023    Both eyes affected by mild nonproliferative diabetic retinopathy with macular edema, associated with type 2 diabetes mellitus (CMS/[facility]) 08/08/2023    ANDERSON (obstructive sleep apnea) 08/08/2023    Hypertension, uncontrolled 08/08/2023    Hyperlipidemia 10/23/2014    Depression 01/09/2013       Past Medical History:  Past Medical History:   Diagnosis Date    Agatston coronary artery calcium score less than 100 10/09/2023    15    CKD (chronic kidney disease), stage II     Hyperlipidemia     Hypertension     ANDERSON (obstructive sleep apnea) 2020    snoring, fatigue, daytime sleepiness, using CPAP (severe)    Personal history of colonic polyps     Seasonal allergies     Type II diabetes mellitus with nephropathy (CMS/[facility])        Past Surgical History:  Past Surgical History:   Procedure Laterality Date    OTHER SURGICAL HISTORY Bilateral 03/14/2022    Hip surgery - THR         Social History:  Social History     Tobacco Use    Smoking status: Never    Smokeless tobacco: Never   Vaping Use    Vaping Use: Never used   Substance Use Topics    Alcohol use: Yes     Comment: socially    Drug use: Never         Family History:  Family History   Problem Relation Name Age of Onset    Cancer Mother          smoker ? lung    Stroke Father      Diabetes Sister      Hypertension Sister      No Known Problems Daughter          [place] - marketing    No Known Problems Son          woriing at [facility] in [place]    No Known Problems Mother's Brother          Allergies:  Penicillins and Gabapentin    Outpatient Medications:    Current Outpatient Medications:     amLODIPine (Norvasc) 10 mg tablet, Take 1 tablet (10  "mg) by mouth once daily., Disp: 90 tablet, Rfl: 1    atorvastatin (Lipitor) 40 mg tablet, Take 1 tablet (40 mg) by mouth once daily., Disp: 90 tablet, Rfl: 1    blood pressure test kit-large kit, 1 each once daily., Disp: 1 each, Rfl: 0    blood-glucose sensor (FreeStyle Tracey 3 Sensor) device, 1 each every 14 (fourteen) days., Disp: 6 each, Rfl: 3    dapagliflozin propanediol (Farxiga) 10 mg, Take 1 tablet (10 mg) by mouth once daily., Disp: 90 tablet, Rfl: 3    losartan (Cozaar) 25 mg tablet, Take 1 tablet (25 mg) by mouth once daily., Disp: 90 tablet, Rfl: 3    metFORMIN (Glucophage) 500 mg tablet, Take 2 tablets (1,000 mg) by mouth 2 times a day., Disp: 360 tablet, Rfl: 3    tirzepatide (Mounjaro) 7.5 mg/0.5 mL pen injector, Inject 7.5 mg under the skin every 7 days., Disp: 2 mL, Rfl: 11    tirzepatide 5 mg/0.5 mL pen injector, Inject 5 mg under the skin every 7 days., Disp: 2 mL, Rfl: 11    Current Facility-Administered Medications:     triamcinol ac (PF) in 0.9%NaCl (KENALOG) injection 40 mg, 40 mg, intra-articular, Once, Vern Billy MD    Review of Systems:  Pertinent positives in review of systems outlined above.  Complete ROS otherwise negative.           Last Recorded Vitals:  Vitals:    04/04/24 0914 04/04/24 0959   BP: 139/89 124/70   BP Location: Left arm    Patient Position: Sitting    BP Cuff Size: Large adult    Pulse: 88    Temp: 36.3 °C (97.3 °F)    SpO2: 99%    Weight: 104 kg (228 lb 14.4 oz)    Height: 1.727 m (5' 8\")    Body mass index is 34.8 kg/m².        Physical Exam  Vitals reviewed.   Constitutional:       Appearance: Normal appearance.   HENT:      Mouth/Throat:      Pharynx: Oropharynx is clear.   Eyes:      Extraocular Movements: Extraocular movements intact.      Conjunctiva/sclera: Conjunctivae normal.      Pupils: Pupils are equal, round, and reactive to light.   Neck:      Vascular: No carotid bruit.   Cardiovascular:      Rate and Rhythm: Normal rate and regular rhythm. "   Pulmonary:      Effort: Pulmonary effort is normal.      Breath sounds: Normal breath sounds.   Abdominal:      General: Abdomen is flat. Bowel sounds are normal.      Palpations: Abdomen is soft. There is no mass.      Tenderness: There is no abdominal tenderness. There is no right CVA tenderness or left CVA tenderness.   Musculoskeletal:      Cervical back: No tenderness.      Right lower leg: No edema.      Left lower leg: No edema.   Lymphadenopathy:      Cervical: No cervical adenopathy.   Neurological:      General: No focal deficit present.      Mental Status: He is alert and oriented to person, place, and time.   Psychiatric:         Mood and Affect: Mood normal.            The 10-year ASCVD risk score (Margarita CONNELL, et al., 2019) is: 22.2%    Values used to calculate the score:      Age: 58 years      Sex: Male      Is Non- : Yes      Diabetic: Yes      Tobacco smoker: No      Systolic Blood Pressure: 124 mmHg      Is BP treated: Yes      HDL Cholesterol: 28.8 mg/dL      Total Cholesterol: 184 mg/dL      Assessment/Plan   Problem List Items Addressed This Visit       Type II diabetes mellitus with nephropathy (CMS/HCC)     Hemoglobin A1c has improved significantly.  Eye exam is up-to-date.  Foot exam was completed in the office today.  No calluses or skin breakdown.  Mild tinea between the toes.  Daily clotrimazole cream was recommended.  We discussed that he can improve insulin sensitivity by changing diet including increasing intake of fruits and vegetables and high-fiber foods and limiting intake of red meat, processed meats, saturated fat sugar and processed grains.  He was given printed materials and directed to online resources to help with meal planning.         Relevant Orders    Comprehensive Metabolic Panel    Hypertension, uncontrolled    Relevant Orders    Comprehensive Metabolic Panel    Routine health maintenance - Primary     Blood pressure is in a good range.  Diabetes  has come under much better control.  Lipids are due now and will be assessed with his next routine blood work in June or July.  PSA will be updated with blood work in June or July.  Colon cancer screening is up-to-date.  We discussed vaccines, and I recommended a Tdap, PCV 20 and shingles vaccines.  He is going to think it over and update vaccines at his next visit.         Hyperlipidemia    Relevant Orders    Comprehensive Metabolic Panel    Lipid Panel     Other Visit Diagnoses       Benign prostatic hyperplasia without lower urinary tract symptoms        Relevant Orders    Prostate Specific Antigen              A total of 60 minutes was spent reviewing the chart and recent testing and discussing plan of care.     Ayush Cummins MD

## 2024-04-08 ENCOUNTER — APPOINTMENT (OUTPATIENT)
Dept: OPHTHALMOLOGY | Facility: CLINIC | Age: 59
End: 2024-04-08
Payer: COMMERCIAL

## 2024-04-12 ENCOUNTER — APPOINTMENT (OUTPATIENT)
Dept: PRIMARY CARE | Facility: CLINIC | Age: 59
End: 2024-04-12
Payer: COMMERCIAL

## 2024-04-15 PROCEDURE — RXMED WILLOW AMBULATORY MEDICATION CHARGE

## 2024-04-16 ENCOUNTER — PHARMACY VISIT (OUTPATIENT)
Dept: PHARMACY | Facility: CLINIC | Age: 59
End: 2024-04-16
Payer: COMMERCIAL

## 2024-04-16 DIAGNOSIS — I10 PRIMARY HYPERTENSION: ICD-10-CM

## 2024-04-16 DIAGNOSIS — E78.5 HYPERLIPIDEMIA LDL GOAL <100: ICD-10-CM

## 2024-04-17 RX ORDER — AMLODIPINE BESYLATE 10 MG/1
10 TABLET ORAL DAILY
Qty: 90 TABLET | Refills: 1 | Status: SHIPPED | OUTPATIENT
Start: 2024-04-17

## 2024-04-17 RX ORDER — ATORVASTATIN CALCIUM 40 MG/1
40 TABLET, FILM COATED ORAL DAILY
Qty: 90 TABLET | Refills: 1 | Status: SHIPPED | OUTPATIENT
Start: 2024-04-17

## 2024-04-25 ENCOUNTER — TELEMEDICINE CLINICAL SUPPORT (OUTPATIENT)
Dept: ENDOCRINOLOGY | Facility: CLINIC | Age: 59
End: 2024-04-25
Payer: COMMERCIAL

## 2024-04-25 VITALS — WEIGHT: 223 LBS | HEIGHT: 68 IN | BODY MASS INDEX: 33.8 KG/M2

## 2024-04-25 DIAGNOSIS — E11.65 TYPE 2 DIABETES MELLITUS WITH HYPERGLYCEMIA, WITHOUT LONG-TERM CURRENT USE OF INSULIN (MULTI): ICD-10-CM

## 2024-04-25 DIAGNOSIS — Z71.3 DIETARY COUNSELING: Primary | ICD-10-CM

## 2024-04-25 PROCEDURE — 97803 MED NUTRITION INDIV SUBSEQ: CPT | Performed by: DIETITIAN, REGISTERED

## 2024-04-25 NOTE — PATIENT INSTRUCTIONS
Continue to aim for consuming a lean source of protein at meals to assist in providing better satiety and well balanced meals.   Aim for no more than 4 servings of healthy carbohydrate foods at meals (60 grams of carbs).   Can maintain Intermittent Fasting as you are doing.   Continue to aim to choose foods high in fiber such as whole grain breads, cereals, pasta, brown rice, fruits, and vegetables. These foods are digested more slowly, which helps to better control blood sugar levels as well as provide better satiety.  Choose foods with less sugar and fats most often as discussed.   Aim to drink 64 ounces of water daily.  Keep up the great work with your weekly exercise.    Follow-up with nutrition in the future as needed.

## 2024-04-25 NOTE — PROGRESS NOTES
Follow-up Nutrition Assessment    Interval History: Patient presents for follow-up nutrition appointment for education on healthy eating for consideration of Type 2 DM. A1c now at 5.8%, down from last A1c of 8.3%. Still taking Mounjaro medication with a recent increase in dose (yesterday by report). Since last nutrition visit, pt reports doing will with diet overall. Has a goal to work on another 10 lbs weight loss. Still incorporating IF most days and still with well controlled portions. Feeling brody faster with Mounjaro and listening to his body and not over eating. Reports trying to eat healthier foods most often and limit less healthy foods. Recently had pizza but only a small amount. Fluids doing well with water still as primary beverage, meeting recommendations. Also still doing well with weekly physical activity, meeting weekly recommendations. See all interventions/recommendations below as discussed during visit this day.      Nutrition Interventions/Recommendations from last visit scheduled on 2/29/24:  Follow the healthy plate at meal times to assist with portion control as well as to provide well balanced meals.  Healthy Plate: Use a 9-inch diameter plate and aim for ½ plate non-starchy vegetables, ¼ plate lean protein, and ¼ plate complex carbohydrates.  Have a goal of consuming a lean source of protein at meals to assist in providing better satiety.   Aim for no more than 4 servings of healthy carbohydrate foods at meals (60 grams of carbs).   Can maintain Intermittent Fasting as you are doing. Most important is to listen to your body's hunger cues, not forcing a meal when not hungry, but also not allowing yourself to get to the point of feeling ravenous. Practice mindful eating as discussed.   Choose foods high in fiber such as whole grain breads, cereals, pasta, brown rice, fruits, and vegetables. These foods are digested more slowly, which helps to better control blood sugar levels.  Choose foods  "with less sugar and fats.   Maintain sugar-free, calorie-free beverages.   Aim to drink 64 ounces of water daily.  Keep up the great work with your weekly exercise, aiming for next goal of 200-250 minutes of moderate physical activity weekly.  Continue to check blood sugar levels regularly as discussed to determine correlation between food choices and amounts of blood sugar rise.      Anthropometrics:  Height:   Ht Readings from Last 1 Encounters:   04/04/24 1.727 m (5' 8\")      Weight:   Wt Readings from Last 10 Encounters:   04/04/24 104 kg (228 lb 14.4 oz)   03/25/24 102 kg (225 lb)   02/29/24 101 kg (222 lb)   11/21/23 107 kg (235 lb 9.6 oz)   11/09/23 108 kg (239 lb)   09/08/23 108 kg (237 lb 12.8 oz)   08/08/23 106 kg (232 lb 9.6 oz)   12/06/22 106 kg (233 lb 9.6 oz)   10/21/22 108 kg (238 lb)   07/25/22 107 kg (235 lb 9.6 oz)      Current BMI:   BMI Readings from Last 1 Encounters:   04/04/24 34.80 kg/m²        Malnutrition Screening:  Significant Unintentional weight loss: No  Eating less than 75% of usual intake for more than 2 weeks: No  Potential Signs of Inflammation: No    Recommended Malnutrition Diagnosis: No malnutrition identified    Patient reported Information:  - So pleased with improvement with A1c. A1c now at 5.8%, down from last A1c of 8.3%.  - Feels eating since last visit has gone well.  - Still doing some IF most days with first meal eaten at 11 AM. On occasion will have something in the am such as fruit.  - Still with well controlled portions. Feeling brody faster with Mounjaro and listening to body and not over eating and trying to eat healthier foods most often and limit less healthy foods. Recently had pizza but only a small amount.  - Recently started the higher dose of Mounjaro yesterday by report.  - With regards to physical activity, still doing well- 4 days a week bike riding for 30-45 minutes at a time and twice weekly weight lifting.     Nutrition Diagnosis: Food and " nutrition-related knowledge deficit related to lack of prior exposure to information as evidenced by verbalizes incomplete information.     Readiness to Learn:  Cognitive ability: Alert and oriented  Motivation to learn: Interested  Family Support: Unable to assess- family not present  Instruction provided to: Patient  Patient learns best by: Multiple methods  Factors affecting learning: None   Physical limitations affecting learning: None    Education Materials Provided:   None this day    Nutrition Interventions/Recommendations for 4/25/2024:  Continue to aim for consuming a lean source of protein at meals to assist in providing better satiety and well balanced meals.   Aim for no more than 4 servings of healthy carbohydrate foods at meals (60 grams of carbs).   Can maintain Intermittent Fasting as you are doing.   Continue to aim to choose foods high in fiber such as whole grain breads, cereals, pasta, brown rice, fruits, and vegetables. These foods are digested more slowly, which helps to better control blood sugar levels as well as provide better satiety.  Choose foods with less sugar and fats most often as discussed.   Aim to drink 64 ounces of water daily.  Keep up the great work with your weekly exercise.    Follow-up with nutrition in the future as needed.    Nutrition Monitoring & Evaluation: adherence to recommendations and patient stated goals    Need for follow-up: PRN    Referred by: MARZENA Chery-CNP and Dewey EscalonaD    MNT Billing Type: Medical Nutrition Re-Assessment, each 15 min increment, for 2 increments.    SIGNATURE:   Leticia Gonzalez RD, LD, [facility]                                                             DATE:   4/25/2024

## 2024-05-16 ENCOUNTER — PHARMACY VISIT (OUTPATIENT)
Dept: PHARMACY | Facility: CLINIC | Age: 59
End: 2024-05-16
Payer: COMMERCIAL

## 2024-05-16 PROCEDURE — RXMED WILLOW AMBULATORY MEDICATION CHARGE

## 2024-05-23 ENCOUNTER — APPOINTMENT (OUTPATIENT)
Dept: OPHTHALMOLOGY | Facility: CLINIC | Age: 59
End: 2024-05-23
Payer: COMMERCIAL

## 2024-06-08 PROCEDURE — RXMED WILLOW AMBULATORY MEDICATION CHARGE

## 2024-06-18 ENCOUNTER — PHARMACY VISIT (OUTPATIENT)
Dept: PHARMACY | Facility: CLINIC | Age: 59
End: 2024-06-18
Payer: COMMERCIAL

## 2024-06-20 ENCOUNTER — APPOINTMENT (OUTPATIENT)
Dept: NEPHROLOGY | Facility: CLINIC | Age: 59
End: 2024-06-20
Payer: COMMERCIAL

## 2024-06-26 ENCOUNTER — APPOINTMENT (OUTPATIENT)
Dept: OPHTHALMOLOGY | Facility: CLINIC | Age: 59
End: 2024-06-26
Payer: COMMERCIAL

## 2024-06-26 DIAGNOSIS — E11.3299 NPDR (NONPROLIFERATIVE DIABETIC RETINOPATHY) (MULTI): ICD-10-CM

## 2024-06-26 DIAGNOSIS — E11.3311 MODERATE NONPROLIFERATIVE DIABETIC RETINOPATHY OF RIGHT EYE WITH MACULAR EDEMA ASSOCIATED WITH TYPE 2 DIABETES MELLITUS (MULTI): Primary | ICD-10-CM

## 2024-06-26 PROCEDURE — 92134 CPTRZ OPH DX IMG PST SGM RTA: CPT

## 2024-06-26 PROCEDURE — 67028 INJECTION EYE DRUG: CPT | Mod: RIGHT SIDE

## 2024-06-26 PROCEDURE — 3008F BODY MASS INDEX DOCD: CPT

## 2024-06-26 PROCEDURE — 3060F POS MICROALBUMINURIA REV: CPT

## 2024-06-26 PROCEDURE — 1036F TOBACCO NON-USER: CPT

## 2024-06-26 PROCEDURE — 4010F ACE/ARB THERAPY RXD/TAKEN: CPT

## 2024-06-26 ASSESSMENT — ENCOUNTER SYMPTOMS
NEUROLOGICAL NEGATIVE: 0
ENDOCRINE NEGATIVE: 0
HEMATOLOGIC/LYMPHATIC NEGATIVE: 0
CONSTITUTIONAL NEGATIVE: 0
EYES NEGATIVE: 0
CARDIOVASCULAR NEGATIVE: 0
GASTROINTESTINAL NEGATIVE: 0
ALLERGIC/IMMUNOLOGIC NEGATIVE: 0
MUSCULOSKELETAL NEGATIVE: 0
RESPIRATORY NEGATIVE: 0
PSYCHIATRIC NEGATIVE: 0

## 2024-06-26 ASSESSMENT — CONF VISUAL FIELD
OS_SUPERIOR_TEMPORAL_RESTRICTION: 0
OD_NORMAL: 1
OD_INFERIOR_NASAL_RESTRICTION: 0
OS_SUPERIOR_NASAL_RESTRICTION: 0
OS_INFERIOR_NASAL_RESTRICTION: 0
OD_SUPERIOR_TEMPORAL_RESTRICTION: 0
OS_NORMAL: 1
OD_INFERIOR_TEMPORAL_RESTRICTION: 0
OS_INFERIOR_TEMPORAL_RESTRICTION: 0
OD_SUPERIOR_NASAL_RESTRICTION: 0

## 2024-06-26 ASSESSMENT — SLIT LAMP EXAM - LIDS
COMMENTS: GOOD POSITION
COMMENTS: GOOD POSITION

## 2024-06-26 ASSESSMENT — VISUAL ACUITY
OS_SC+: -2
OD_SC+: -1
OS_SC: 20/20
OD_SC: 20/25
METHOD: SNELLEN - LINEAR

## 2024-06-26 ASSESSMENT — CUP TO DISC RATIO
OS_RATIO: .7
OD_RATIO: .6

## 2024-06-26 ASSESSMENT — EXTERNAL EXAM - RIGHT EYE: OD_EXAM: NORMAL

## 2024-06-26 ASSESSMENT — TONOMETRY
OS_IOP_MMHG: 16
OD_IOP_MMHG: 16
IOP_METHOD: GOLDMANN APPLANATION

## 2024-06-26 ASSESSMENT — EXTERNAL EXAM - LEFT EYE: OS_EXAM: NORMAL

## 2024-06-26 NOTE — PROGRESS NOTES
Severe nonproliferative diabetic retinopathy with macular edema in type II DM, both eyesE11.8097  - Hx of Diabetes 3 years  - Most recent HbA1c = 5.8 (down from 8)  - Hx of HTN  - ? Hx of kidney disease    S/p IVV OU #5    - OCT 06/26/24   --- OD: [facility] involving [facility]ME with large turbid cysts.   --- OS: [facility] non Ci DME.    #Impression/Plan#:   - Patient lost to follow up; vision is stable.   - DME OD is persistent, non responsive to IVV; discussed options of continuing IVV vs switching to steroids.   - Discussed risks/benefits/alteranatives of treatment options. JUANITA HD OD was given today  - Emphasized the importance of blood glucose, BP, and cholestrol level control  - Please get PA for Ozurdex OD; Also his prior authorization to IVV is not visible in referral so please add it  - RTC in 2 months     Glaucoma suspect~H40.009   Off drops, following up with Dr. Mike

## 2024-07-09 PROCEDURE — RXMED WILLOW AMBULATORY MEDICATION CHARGE

## 2024-07-11 ENCOUNTER — PHARMACY VISIT (OUTPATIENT)
Dept: PHARMACY | Facility: CLINIC | Age: 59
End: 2024-07-11
Payer: COMMERCIAL

## 2024-07-24 ENCOUNTER — APPOINTMENT (OUTPATIENT)
Dept: PRIMARY CARE | Facility: CLINIC | Age: 59
End: 2024-07-24
Payer: COMMERCIAL

## 2024-07-24 ENCOUNTER — LAB (OUTPATIENT)
Dept: LAB | Facility: LAB | Age: 59
End: 2024-07-24
Payer: COMMERCIAL

## 2024-07-24 VITALS
HEART RATE: 88 BPM | OXYGEN SATURATION: 99 % | DIASTOLIC BLOOD PRESSURE: 74 MMHG | SYSTOLIC BLOOD PRESSURE: 120 MMHG | TEMPERATURE: 97.7 F | WEIGHT: 226.2 LBS | BODY MASS INDEX: 34.39 KG/M2

## 2024-07-24 DIAGNOSIS — I10 PRIMARY HYPERTENSION: ICD-10-CM

## 2024-07-24 DIAGNOSIS — N40.0 BENIGN PROSTATIC HYPERPLASIA WITHOUT LOWER URINARY TRACT SYMPTOMS: ICD-10-CM

## 2024-07-24 DIAGNOSIS — E78.5 HYPERLIPIDEMIA, UNSPECIFIED HYPERLIPIDEMIA TYPE: ICD-10-CM

## 2024-07-24 DIAGNOSIS — E66.01 CLASS 2 SEVERE OBESITY DUE TO EXCESS CALORIES WITH SERIOUS COMORBIDITY AND BODY MASS INDEX (BMI) OF 36.0 TO 36.9 IN ADULT (MULTI): ICD-10-CM

## 2024-07-24 DIAGNOSIS — I10 HYPERTENSION, UNCONTROLLED: ICD-10-CM

## 2024-07-24 DIAGNOSIS — E11.21 TYPE II DIABETES MELLITUS WITH NEPHROPATHY (MULTI): ICD-10-CM

## 2024-07-24 DIAGNOSIS — E11.21 TYPE II DIABETES MELLITUS WITH NEPHROPATHY (MULTI): Primary | ICD-10-CM

## 2024-07-24 DIAGNOSIS — N18.2 CKD (CHRONIC KIDNEY DISEASE), STAGE II: ICD-10-CM

## 2024-07-24 LAB
ALBUMIN SERPL BCP-MCNC: 4.5 G/DL (ref 3.4–5)
ALP SERPL-CCNC: 93 U/L (ref 33–120)
ALT SERPL W P-5'-P-CCNC: 16 U/L (ref 10–52)
ANION GAP SERPL CALC-SCNC: 16 MMOL/L (ref 10–20)
AST SERPL W P-5'-P-CCNC: 16 U/L (ref 9–39)
BILIRUB SERPL-MCNC: 1 MG/DL (ref 0–1.2)
BUN SERPL-MCNC: 18 MG/DL (ref 6–23)
CALCIUM SERPL-MCNC: 9.6 MG/DL (ref 8.6–10.3)
CHLORIDE SERPL-SCNC: 99 MMOL/L (ref 98–107)
CHOLEST SERPL-MCNC: 159 MG/DL (ref 0–199)
CHOLESTEROL/HDL RATIO: 4.8
CO2 SERPL-SCNC: 27 MMOL/L (ref 21–32)
CREAT SERPL-MCNC: 1.29 MG/DL (ref 0.5–1.3)
CREAT UR-MCNC: 96.6 MG/DL (ref 20–370)
EGFRCR SERPLBLD CKD-EPI 2021: 65 ML/MIN/1.73M*2
EST. AVERAGE GLUCOSE BLD GHB EST-MCNC: 100 MG/DL
GLUCOSE SERPL-MCNC: 124 MG/DL (ref 74–99)
HBA1C MFR BLD: 5.1 %
HDLC SERPL-MCNC: 33.3 MG/DL
LDLC SERPL CALC-MCNC: 85 MG/DL
MICROALBUMIN UR-MCNC: 226.9 MG/L
MICROALBUMIN/CREAT UR: 234.9 UG/MG CREAT
NON HDL CHOLESTEROL: 126 MG/DL (ref 0–149)
POTASSIUM SERPL-SCNC: 4.8 MMOL/L (ref 3.5–5.3)
PROT SERPL-MCNC: 8.1 G/DL (ref 6.4–8.2)
PSA SERPL-MCNC: 0.3 NG/ML
SODIUM SERPL-SCNC: 137 MMOL/L (ref 136–145)
TRIGL SERPL-MCNC: 204 MG/DL (ref 0–149)
VLDL: 41 MG/DL (ref 0–40)

## 2024-07-24 PROCEDURE — 80053 COMPREHEN METABOLIC PANEL: CPT

## 2024-07-24 PROCEDURE — 82043 UR ALBUMIN QUANTITATIVE: CPT

## 2024-07-24 PROCEDURE — 4010F ACE/ARB THERAPY RXD/TAKEN: CPT | Performed by: INTERNAL MEDICINE

## 2024-07-24 PROCEDURE — 1036F TOBACCO NON-USER: CPT | Performed by: INTERNAL MEDICINE

## 2024-07-24 PROCEDURE — 36415 COLL VENOUS BLD VENIPUNCTURE: CPT

## 2024-07-24 PROCEDURE — 3074F SYST BP LT 130 MM HG: CPT | Performed by: INTERNAL MEDICINE

## 2024-07-24 PROCEDURE — 84153 ASSAY OF PSA TOTAL: CPT

## 2024-07-24 PROCEDURE — 99214 OFFICE O/P EST MOD 30 MIN: CPT | Performed by: INTERNAL MEDICINE

## 2024-07-24 PROCEDURE — 3078F DIAST BP <80 MM HG: CPT | Performed by: INTERNAL MEDICINE

## 2024-07-24 PROCEDURE — 82570 ASSAY OF URINE CREATININE: CPT

## 2024-07-24 PROCEDURE — 3060F POS MICROALBUMINURIA REV: CPT | Performed by: INTERNAL MEDICINE

## 2024-07-24 PROCEDURE — 80061 LIPID PANEL: CPT

## 2024-07-24 PROCEDURE — 83036 HEMOGLOBIN GLYCOSYLATED A1C: CPT

## 2024-07-24 NOTE — ASSESSMENT & PLAN NOTE
Glomerular filtration increased after the addition of SGLT2 inhibitor and GLP-1 RA.  Comprehensive metabolic panel will be repeated now, and urine microalbumin will be repeated as well.  DENNIS has follow-up with nephrology in November.

## 2024-07-24 NOTE — PROGRESS NOTES
"Chief Complaint:   Follow-up     History Of Present Illness:    Jose Angel Gutierrez Jr. \"DENNIS\" is a 59 y.o. male with active medical problems outlined below who presents for blood pressure check.    4/4/24  DENNIS has a history of poorly controlled diabetes complicated by mild nephropathy.  He met with an endocrinologist over the past year and started treatment with Mounjaro and Farxiga.  His hemoglobin A1c dropped from 8.3 on 11/21/23 to 5.8 on 3/25/2024.  His weight is also down.  He has a history of mild renal insufficiency and was evaluated by nephrologist.  His recent creatinine returned to baseline.  He keeps up with a routine eye exam but has not had a recent foot exam.  He completed a coronary artery calcium score on 10/9/2023.  His score was 15.  He has a history of bilateral hip replacements due to injuries in his early adult life.  He may have had hip dysplasia as a child.  He is no longer able to run, but he rides a bike for exercise.  He denies chest pain or shortness of breath with exertion.  He denies any change in bowel or bladder habits.  He has a history of obstructive sleep apnea and uses a CPAP on a nightly basis.      INTERVAL HISTORY 7/24/24  Biking for exercise - taking spin classes 3-4d per week, lifting weights 2x per week.   No chest pain or shortness of breath.  Not dizzy. No palpitations.  No change in bowel or bladder habits.  Recent eye exam - getting injections in eyes and using steroid eye drop  Vision much better since blood sugar improvement.  Will have follow up in August.      Patient Active Problem List   Diagnosis    Follow-up exam    Type II diabetes mellitus with nephropathy (Multi)    Class 2 severe obesity due to excess calories with serious comorbidity and body mass index (BMI) of 36.0 to 36.9 in adult (Multi)    Primary hypertension    Both eyes affected by mild nonproliferative diabetic retinopathy with macular edema, associated with type 2 diabetes mellitus (Multi)    ANDERSON " (obstructive sleep apnea)    Hypertension, uncontrolled    Routine health maintenance    Depression    Elevated serum creatinine    Erectile dysfunction    Glaucoma suspect    Hyperlipidemia    Hypertensive retinopathy    NPDR (nonproliferative diabetic retinopathy) (Multi)    Osteoarthritis of right hip    Skin cyst    Testosterone deficiency    Tubulovillous adenoma polyp of colon    Moderate nonproliferative diabetic retinopathy of both eyes with macular edema associated with type 2 diabetes mellitus (Multi)    Seasonal allergies    Agatston coronary artery calcium score less than 100    CKD (chronic kidney disease), stage II    Personal history of colonic polyps       Current Outpatient Medications:     amLODIPine (Norvasc) 10 mg tablet, take 1 tablet by mouth once daily, Disp: 90 tablet, Rfl: 1    atorvastatin (Lipitor) 40 mg tablet, take 1 tablet by mouth once daily, Disp: 90 tablet, Rfl: 1    blood pressure test kit-large kit, 1 each once daily., Disp: 1 each, Rfl: 0    blood-glucose sensor (FreeStyle Tracey 3 Sensor) device, 1 each every 14 (fourteen) days., Disp: 6 each, Rfl: 3    dapagliflozin propanediol (Farxiga) 10 mg, Take 1 tablet (10 mg) by mouth once daily., Disp: 90 tablet, Rfl: 3    losartan (Cozaar) 25 mg tablet, Take 1 tablet (25 mg) by mouth once daily., Disp: 90 tablet, Rfl: 3    metFORMIN (Glucophage) 500 mg tablet, Take 2 tablets (1,000 mg) by mouth 2 times a day., Disp: 360 tablet, Rfl: 3    tirzepatide (Mounjaro) 7.5 mg/0.5 mL pen injector, Inject 7.5 mg under the skin every 7 days., Disp: 2 mL, Rfl: 11    tirzepatide 5 mg/0.5 mL pen injector, Inject 5 mg under the skin every 7 days., Disp: 2 mL, Rfl: 11    Current Facility-Administered Medications:     triamcinol ac (PF) in 0.9%NaCl (KENALOG) injection 40 mg, 40 mg, intra-articular, Once, Vern Billy MD  Penicillins and Gabapentin  Social History     Tobacco Use    Smoking status: Never    Smokeless tobacco: Never   Vaping Use     Vaping status: Never Used   Substance Use Topics    Alcohol use: Yes     Comment: socially    Drug use: Never         All pertinent aspects of medical and surgical history were reviewed and updated in chart    Review of Systems     Last Recorded Vitals:  Patient Vitals for the past 24 hrs:   BP Temp Pulse SpO2 Weight   07/24/24 0804 120/74 36.5 °C (97.7 °F) 88 99 % 103 kg (226 lb 3.2 oz)          Physical Exam        The 10-year ASCVD risk score (Margarita CONNELL, et al., 2019) is: 21.9%    Values used to calculate the score:      Age: 59 years      Sex: Male      Is Non- : Yes      Diabetic: Yes      Tobacco smoker: No      Systolic Blood Pressure: 120 mmHg      Is BP treated: Yes      HDL Cholesterol: 28.8 mg/dL      Total Cholesterol: 184 mg/dL      Assessment/Plan   Problem List Items Addressed This Visit       Type II diabetes mellitus with nephropathy (Multi) - Primary     Will check A1C and urine microalb.   Eye and foot exams are UTD.         Relevant Orders    Albumin-Creatinine Ratio, Urine Random    Hemoglobin A1C    Class 2 severe obesity due to excess calories with serious comorbidity and body mass index (BMI) of 36.0 to 36.9 in adult (Multi)     We discussed the plateau phenomenon that occurs at about 6 months of weight loss.  We discussed the need to further restrict calories and increase frequency and intensity of exercise.  We set a goal for adding 1/3-day of muscle building exercises.         Primary hypertension     BP in a very good range.  Continue current medications.           Hyperlipidemia     Fasting lipids due now.  Order placed         CKD (chronic kidney disease), stage II     Glomerular filtration increased after the addition of SGLT2 inhibitor and GLP-1 RA.  Comprehensive metabolic panel will be repeated now, and urine microalbumin will be repeated as well.  DENNIS has follow-up with nephrology in November.            A total of 30 minutes was spent reviewing the chart and  recent testing and discussing plan of care.         Ayush Cummins MD

## 2024-07-24 NOTE — ASSESSMENT & PLAN NOTE
We discussed the plateau phenomenon that occurs at about 6 months of weight loss.  We discussed the need to further restrict calories and increase frequency and intensity of exercise.  We set a goal for adding 1/3-day of muscle building exercises.

## 2024-07-27 DIAGNOSIS — E11.21 TYPE II DIABETES MELLITUS WITH NEPHROPATHY (MULTI): Primary | ICD-10-CM

## 2024-07-27 DIAGNOSIS — I10 PRIMARY HYPERTENSION: ICD-10-CM

## 2024-07-31 ENCOUNTER — APPOINTMENT (OUTPATIENT)
Dept: OPHTHALMOLOGY | Facility: CLINIC | Age: 59
End: 2024-07-31
Payer: COMMERCIAL

## 2024-08-14 DIAGNOSIS — N18.2 CKD (CHRONIC KIDNEY DISEASE) STAGE 2, GFR 60-89 ML/MIN: ICD-10-CM

## 2024-08-14 DIAGNOSIS — I10 PRIMARY HYPERTENSION: ICD-10-CM

## 2024-08-14 DIAGNOSIS — E11.9 TYPE 2 DIABETES MELLITUS WITHOUT COMPLICATION, WITHOUT LONG-TERM CURRENT USE OF INSULIN (MULTI): ICD-10-CM

## 2024-08-14 DIAGNOSIS — E11.65 TYPE 2 DIABETES MELLITUS WITH HYPERGLYCEMIA, WITHOUT LONG-TERM CURRENT USE OF INSULIN (MULTI): ICD-10-CM

## 2024-08-14 RX ORDER — DAPAGLIFLOZIN 10 MG/1
10 TABLET, FILM COATED ORAL DAILY
Qty: 90 TABLET | Refills: 3 | Status: SHIPPED | OUTPATIENT
Start: 2024-08-14 | End: 2025-08-14

## 2024-08-14 RX ORDER — LOSARTAN POTASSIUM 25 MG/1
25 TABLET ORAL DAILY
Qty: 90 TABLET | Refills: 3 | Status: SHIPPED | OUTPATIENT
Start: 2024-08-14

## 2024-08-14 RX ORDER — BLOOD-GLUCOSE SENSOR
1 EACH MISCELLANEOUS
Qty: 6 EACH | Refills: 3 | Status: SHIPPED | OUTPATIENT
Start: 2024-08-14

## 2024-08-14 RX ORDER — METFORMIN HYDROCHLORIDE 500 MG/1
1000 TABLET ORAL 2 TIMES DAILY
Qty: 360 TABLET | Refills: 3 | Status: SHIPPED | OUTPATIENT
Start: 2024-08-14

## 2024-08-14 RX ORDER — TIRZEPATIDE 7.5 MG/.5ML
7.5 INJECTION, SOLUTION SUBCUTANEOUS
Qty: 2 ML | Refills: 11 | Status: SHIPPED | OUTPATIENT
Start: 2024-08-14

## 2024-08-15 PROCEDURE — RXMED WILLOW AMBULATORY MEDICATION CHARGE

## 2024-08-21 ENCOUNTER — PHARMACY VISIT (OUTPATIENT)
Dept: PHARMACY | Facility: CLINIC | Age: 59
End: 2024-08-21
Payer: COMMERCIAL

## 2024-08-21 PROCEDURE — RXMED WILLOW AMBULATORY MEDICATION CHARGE

## 2024-09-04 PROCEDURE — RXMED WILLOW AMBULATORY MEDICATION CHARGE

## 2024-09-13 ENCOUNTER — PHARMACY VISIT (OUTPATIENT)
Dept: PHARMACY | Facility: CLINIC | Age: 59
End: 2024-09-13
Payer: COMMERCIAL

## 2024-09-13 PROCEDURE — RXMED WILLOW AMBULATORY MEDICATION CHARGE

## 2024-09-25 ENCOUNTER — APPOINTMENT (OUTPATIENT)
Dept: ENDOCRINOLOGY | Facility: CLINIC | Age: 59
End: 2024-09-25
Payer: COMMERCIAL

## 2024-09-25 VITALS
HEART RATE: 80 BPM | SYSTOLIC BLOOD PRESSURE: 129 MMHG | TEMPERATURE: 97 F | WEIGHT: 226 LBS | RESPIRATION RATE: 18 BRPM | DIASTOLIC BLOOD PRESSURE: 86 MMHG | HEIGHT: 68 IN | BODY MASS INDEX: 34.25 KG/M2

## 2024-09-25 DIAGNOSIS — E66.09 CLASS 1 OBESITY DUE TO EXCESS CALORIES WITH SERIOUS COMORBIDITY AND BODY MASS INDEX (BMI) OF 34.0 TO 34.9 IN ADULT: ICD-10-CM

## 2024-09-25 DIAGNOSIS — N18.2 CKD (CHRONIC KIDNEY DISEASE) STAGE 2, GFR 60-89 ML/MIN: ICD-10-CM

## 2024-09-25 DIAGNOSIS — I10 HYPERTENSION, UNCONTROLLED: ICD-10-CM

## 2024-09-25 DIAGNOSIS — E11.3513 TYPE 2 DIABETES MELLITUS WITH BOTH EYES AFFECTED BY PROLIFERATIVE RETINOPATHY AND MACULAR EDEMA, WITHOUT LONG-TERM CURRENT USE OF INSULIN: Primary | ICD-10-CM

## 2024-09-25 DIAGNOSIS — E11.9 TYPE 2 DIABETES MELLITUS WITHOUT COMPLICATION, WITHOUT LONG-TERM CURRENT USE OF INSULIN (MULTI): ICD-10-CM

## 2024-09-25 DIAGNOSIS — G47.33 OBSTRUCTIVE SLEEP APNEA: ICD-10-CM

## 2024-09-25 DIAGNOSIS — E78.5 HYPERLIPIDEMIA LDL GOAL <100: ICD-10-CM

## 2024-09-25 DIAGNOSIS — E78.2 HYPERLIPEMIA, MIXED: ICD-10-CM

## 2024-09-25 DIAGNOSIS — E11.65 TYPE 2 DIABETES MELLITUS WITH HYPERGLYCEMIA, WITHOUT LONG-TERM CURRENT USE OF INSULIN: ICD-10-CM

## 2024-09-25 PROCEDURE — 95251 CONT GLUC MNTR ANALYSIS I&R: CPT | Performed by: NURSE PRACTITIONER

## 2024-09-25 PROCEDURE — 3074F SYST BP LT 130 MM HG: CPT | Performed by: NURSE PRACTITIONER

## 2024-09-25 PROCEDURE — 4010F ACE/ARB THERAPY RXD/TAKEN: CPT | Performed by: NURSE PRACTITIONER

## 2024-09-25 PROCEDURE — 1036F TOBACCO NON-USER: CPT | Performed by: NURSE PRACTITIONER

## 2024-09-25 PROCEDURE — 3008F BODY MASS INDEX DOCD: CPT | Performed by: NURSE PRACTITIONER

## 2024-09-25 PROCEDURE — 3048F LDL-C <100 MG/DL: CPT | Performed by: NURSE PRACTITIONER

## 2024-09-25 PROCEDURE — 3079F DIAST BP 80-89 MM HG: CPT | Performed by: NURSE PRACTITIONER

## 2024-09-25 PROCEDURE — RXMED WILLOW AMBULATORY MEDICATION CHARGE

## 2024-09-25 PROCEDURE — 99214 OFFICE O/P EST MOD 30 MIN: CPT | Performed by: NURSE PRACTITIONER

## 2024-09-25 PROCEDURE — 3044F HG A1C LEVEL LT 7.0%: CPT | Performed by: NURSE PRACTITIONER

## 2024-09-25 PROCEDURE — 3060F POS MICROALBUMINURIA REV: CPT | Performed by: NURSE PRACTITIONER

## 2024-09-25 RX ORDER — METFORMIN HYDROCHLORIDE 500 MG/1
500 TABLET ORAL 2 TIMES DAILY
Qty: 180 TABLET | Refills: 3 | Status: SHIPPED | OUTPATIENT
Start: 2024-09-25

## 2024-09-25 RX ORDER — BLOOD-GLUCOSE SENSOR
EACH MISCELLANEOUS
Qty: 6 EACH | Refills: 3 | Status: SHIPPED | OUTPATIENT
Start: 2024-09-25

## 2024-09-25 RX ORDER — ATORVASTATIN CALCIUM 40 MG/1
40 TABLET, FILM COATED ORAL DAILY
Qty: 90 TABLET | Refills: 3 | Status: SHIPPED | OUTPATIENT
Start: 2024-09-25

## 2024-09-25 ASSESSMENT — PATIENT HEALTH QUESTIONNAIRE - PHQ9
1. LITTLE INTEREST OR PLEASURE IN DOING THINGS: NOT AT ALL
SUM OF ALL RESPONSES TO PHQ9 QUESTIONS 1 AND 2: 0
2. FEELING DOWN, DEPRESSED OR HOPELESS: NOT AT ALL

## 2024-09-25 ASSESSMENT — ENCOUNTER SYMPTOMS
NERVOUS/ANXIOUS: 0
SHORTNESS OF BREATH: 0
FATIGUE: 0
DIARRHEA: 0
SLEEP DISTURBANCE: 0
FREQUENCY: 0
CONSTIPATION: 0
NAUSEA: 0
APPETITE CHANGE: 0
POLYDIPSIA: 0
DIZZINESS: 0
SEIZURES: 0
PALPITATIONS: 0
NUMBNESS: 0
ACTIVITY CHANGE: 0
POLYPHAGIA: 0
WEAKNESS: 0

## 2024-09-25 NOTE — PATIENT INSTRUCTIONS
Type 2 diabetes mellitus, is at goal with a GMI 6.2% at today visit and A1c 5.1%    RX changes:   Increase Mounjaro 10 mg weekly. Rx sent for 7.5 mg as well due to shortage. Please fill higher dose as often as you can.   REDUCE Metformin 500 mg 1 tablet twice daily  CONTINUE Farxiga 10 mg daily. If you are not eating, or ill do not take.   START COLETTE 3+, this is now a 15 day sensor, use the same azalia.   Education:  interpretation of lab results, blood sugar goals, and complications of diabetes mellitus  Sleep Apnea: Instructed to use mask nightly.  Hypertension: At goal. No changes.   Hyperlipidemia: At goal. No changes.   Retinopathy: Followed closely by retinologist  Proteinuria: Appropriately on SGLT2.   BMI 34: We have increased mounjaro and he has option to be seen a third time.   Follow up: I recommend diabetes care be in 6 months.

## 2024-09-25 NOTE — PROGRESS NOTES
Subjective   DENNIS Gutierrez Jr. is a 59 y.o. male who presents for follow up of Type 2 diabetes mellitus. The initial diagnosis of diabetes was made  in 2014 . The patient does not have a known family history of type 2 diabetes.    Known complications due to diabetes included retinopathy and proteinuria    Cardiovascular risk factors include advanced age (older than 55 for men, 65 for women), diabetes mellitus, dyslipidemia, hypertension, male gender, microalbuminuria, and obesity (BMI >= 30 kg/m2). The patient is on an ACE inhibitor or angiotensin II receptor blocker.      Current diabetes regimen is as follows:   Mounjaro 5 mg weekly   Metformin 500 mg 2 tablets twice daily  Farxiga 10 mg daily    The patient is currently checking the blood glucose using Tracey 3 continuous glucose monitor.     Hypoglycemia frequency: none  Hypoglycemia awareness: Yes     Exercise: every other day   Meal panning: He is using no plan.    Review of Systems   Constitutional:  Negative for activity change, appetite change and fatigue.   Respiratory:  Negative for shortness of breath.    Cardiovascular:  Negative for chest pain, palpitations and leg swelling.   Gastrointestinal:  Negative for constipation, diarrhea and nausea.   Endocrine: Negative for cold intolerance, heat intolerance, polydipsia, polyphagia and polyuria.   Genitourinary:  Negative for frequency.   Musculoskeletal:  Negative for gait problem.   Skin:  Negative for rash.   Neurological:  Negative for dizziness, seizures, weakness and numbness.   Psychiatric/Behavioral:  Negative for sleep disturbance and suicidal ideas. The patient is not nervous/anxious.      Objective   There were no vitals taken for this visit.  Physical Exam  Constitutional:       Appearance: He is obese.   Pulmonary:      Effort: Pulmonary effort is normal.   Skin:     General: Skin is warm and dry.   Neurological:      Mental Status: He is alert and oriented to person, place, and time.    Psychiatric:         Mood and Affect: Mood normal.         Behavior: Behavior normal.         Thought Content: Thought content normal.         Judgment: Judgment normal.       Lab Review  Glucose (mg/dL)   Date Value   07/24/2024 124 (H)   03/08/2024 127 (H)   09/08/2023 299 (H)   07/25/2022 264 (H)   03/14/2022 274 (H)     POC HEMOGLOBIN A1c (%)   Date Value   03/25/2024 5.8   11/21/2023 8.3 (A)     Hemoglobin A1C (%)   Date Value   07/24/2024 5.1   07/28/2023 8.3 (A)   07/25/2022 7.7 (A)   03/14/2022 8.8 (A)   12/21/2020 8.8 (H)     Bicarbonate (mmol/L)   Date Value   07/24/2024 27   03/08/2024 30   09/08/2023 27   07/25/2022 29   03/14/2022 28     Urea Nitrogen (mg/dL)   Date Value   07/24/2024 18   03/08/2024 17   09/08/2023 13   07/25/2022 13   03/14/2022 15     Creatinine (mg/dL)   Date Value   07/24/2024 1.29   03/08/2024 1.16   09/08/2023 1.38 (H)   07/25/2022 1.23   03/14/2022 1.52 (H)       Downloaded and interrogated Tracey CGM:   Average Glucose 120 mg/dL  Glucose Management Indicator (GMI) 6.2%  Glucose Variability 24%  Time in Range 95%    Health Maintenance:   Foot Exam: None  Eye Exam: July 2024, he has had injections in both eyes and is followed closely.   Lipid: Total 159 LDL 85 July 2024  Urine Albumin: 234.9 alb/Creat ratio July 2024 He is seeing nephrology.     Assessment/Plan   Diagnoses and all orders for this visit:  Type 2 diabetes mellitus with both eyes affected by proliferative retinopathy and macular edema, without long-term current use of insulin (Multi)  Obstructive sleep apnea  Hypertension, uncontrolled  Hyperlipemia, mixed  CKD (chronic kidney disease) stage 2, GFR 60-89 ml/min  Class 1 obesity due to excess calories with serious comorbidity and body mass index (BMI) of 34.0 to 34.9 in adult    Type 2 diabetes mellitus, is at goal with a GMI 6.2% at today visit and A1c 5.1%    RX changes:   Increase Mounjaro 10 mg weekly. Rx sent for 7.5 mg as well due to shortage. Please fill  higher dose as often as you can.   REDUCE Metformin 500 mg 1 tablet twice daily  CONTINUE Farxiga 10 mg daily. If you are not eating, or ill do not take.   START COLETTE 3+, this is now a 15 day sensor, use the same azalia.   Education:  interpretation of lab results, blood sugar goals, and complications of diabetes mellitus  Sleep Apnea: Instructed to use mask nightly.  Hypertension: At goal. No changes.   Hyperlipidemia: At goal. No changes.   Retinopathy: Followed closely by retinologist.   Proteinuria: Appropriately on SGLT2.   BMI 34: We have increased mounjaro and he has option to be seen a third time.   Follow up: I recommend diabetes care be in 6 months.

## 2024-09-26 ENCOUNTER — APPOINTMENT (OUTPATIENT)
Dept: ENDOCRINOLOGY | Facility: CLINIC | Age: 59
End: 2024-09-26
Payer: COMMERCIAL

## 2024-10-01 ENCOUNTER — PHARMACY VISIT (OUTPATIENT)
Dept: PHARMACY | Facility: CLINIC | Age: 59
End: 2024-10-01
Payer: COMMERCIAL

## 2024-10-09 ENCOUNTER — APPOINTMENT (OUTPATIENT)
Dept: PRIMARY CARE | Facility: CLINIC | Age: 59
End: 2024-10-09
Payer: COMMERCIAL

## 2024-10-09 PROCEDURE — RXMED WILLOW AMBULATORY MEDICATION CHARGE

## 2024-10-14 ENCOUNTER — PHARMACY VISIT (OUTPATIENT)
Dept: PHARMACY | Facility: CLINIC | Age: 59
End: 2024-10-14
Payer: COMMERCIAL

## 2024-11-01 ENCOUNTER — APPOINTMENT (OUTPATIENT)
Dept: PRIMARY CARE | Facility: CLINIC | Age: 59
End: 2024-11-01
Payer: COMMERCIAL

## 2024-11-06 PROCEDURE — RXMED WILLOW AMBULATORY MEDICATION CHARGE

## 2024-11-12 PROCEDURE — RXMED WILLOW AMBULATORY MEDICATION CHARGE

## 2024-11-14 ENCOUNTER — PHARMACY VISIT (OUTPATIENT)
Dept: PHARMACY | Facility: CLINIC | Age: 59
End: 2024-11-14
Payer: COMMERCIAL

## 2024-11-14 PROCEDURE — RXMED WILLOW AMBULATORY MEDICATION CHARGE

## 2024-11-20 ENCOUNTER — APPOINTMENT (OUTPATIENT)
Dept: PRIMARY CARE | Facility: CLINIC | Age: 59
End: 2024-11-20
Payer: COMMERCIAL

## 2024-11-20 VITALS
SYSTOLIC BLOOD PRESSURE: 142 MMHG | HEART RATE: 79 BPM | BODY MASS INDEX: 35.31 KG/M2 | WEIGHT: 232.2 LBS | DIASTOLIC BLOOD PRESSURE: 78 MMHG | OXYGEN SATURATION: 97 % | TEMPERATURE: 97.5 F

## 2024-11-20 DIAGNOSIS — I10 PRIMARY HYPERTENSION: Primary | ICD-10-CM

## 2024-11-20 DIAGNOSIS — E11.9 TYPE 2 DIABETES MELLITUS WITHOUT COMPLICATION, WITHOUT LONG-TERM CURRENT USE OF INSULIN (MULTI): ICD-10-CM

## 2024-11-20 DIAGNOSIS — E78.5 HYPERLIPIDEMIA, UNSPECIFIED HYPERLIPIDEMIA TYPE: ICD-10-CM

## 2024-11-20 DIAGNOSIS — G47.33 OSA (OBSTRUCTIVE SLEEP APNEA): ICD-10-CM

## 2024-11-20 PROCEDURE — 3077F SYST BP >= 140 MM HG: CPT | Performed by: INTERNAL MEDICINE

## 2024-11-20 PROCEDURE — 99214 OFFICE O/P EST MOD 30 MIN: CPT | Performed by: INTERNAL MEDICINE

## 2024-11-20 PROCEDURE — 3078F DIAST BP <80 MM HG: CPT | Performed by: INTERNAL MEDICINE

## 2024-11-20 PROCEDURE — 3060F POS MICROALBUMINURIA REV: CPT | Performed by: INTERNAL MEDICINE

## 2024-11-20 PROCEDURE — 4010F ACE/ARB THERAPY RXD/TAKEN: CPT | Performed by: INTERNAL MEDICINE

## 2024-11-20 PROCEDURE — 3044F HG A1C LEVEL LT 7.0%: CPT | Performed by: INTERNAL MEDICINE

## 2024-11-20 PROCEDURE — 3048F LDL-C <100 MG/DL: CPT | Performed by: INTERNAL MEDICINE

## 2024-11-20 NOTE — PATIENT INSTRUCTIONS
Begin Vitamin D 2,000 international units     Limit sodium to 1500 mg per day     Schedule blood pressure check in 3 mo    -Get to bed at the same time and get up at the same time every day   -Move to a different room if you cannot sleep  -Bedroom should be cool, dark, and quiet   -Create a routine to prepare for sleep -avoid electronics for 90 min, do not eat or drink within 2 hour of bed,   -Exercise daily   -Spend time in the bright sunlight during the afternoon  -Avoid caffeine including soda     Have CPAP settings adjusted

## 2024-11-20 NOTE — PROGRESS NOTES
"Chief Complaint:   Follow-up     History Of Present Illness:    Jose Angel Gutierrez Jr. \"DENNIS\" is a 59 y.o. male with active medical problems outlined below who presents for bp check.       4/4/24  DENNIS has a history of poorly controlled diabetes complicated by mild nephropathy.  He met with an endocrinologist over the past year and started treatment with Mounjaro and Farxiga.  His hemoglobin A1c dropped from 8.3 on 11/21/23 to 5.8 on 3/25/2024.  His weight is also down.  He has a history of mild renal insufficiency and was evaluated by nephrologist.  His recent creatinine returned to baseline.  He keeps up with a routine eye exam but has not had a recent foot exam.  He completed a coronary artery calcium score on 10/9/2023.  His score was 15.  He has a history of bilateral hip replacements due to injuries in his early adult life.  He may have had hip dysplasia as a child.  He is no longer able to run, but he rides a bike for exercise.  He denies chest pain or shortness of breath with exertion.  He denies any change in bowel or bladder habits.  He has a history of obstructive sleep apnea and uses a CPAP on a nightly basis.      INTERVAL HISTORY 7/24/24  Biking for exercise - taking spin classes 3-4d per week, lifting weights 2x per week.   No chest pain or shortness of breath.  Not dizzy. No palpitations.  No change in bowel or bladder habits.  Recent eye exam - getting injections in eyes and using steroid eye drop  Vision much better since blood sugar improvement.  Will have follow up in August.      INTERVAL HISTORY 11/20/24  Biking for exercise, some weights- good energy, no chest pain with exertion.   Using CPAP nightly but still  having interrupted sleep  Working to improve diet.  A1C down from 8.3 to 5.1 over the past year.   Working to get BMI under 30       Patient Active Problem List   Diagnosis    Follow-up exam    Type 2 diabetes mellitus without complication, without long-term current use of insulin (Multi) "    Class 2 severe obesity due to excess calories with serious comorbidity and body mass index (BMI) of 36.0 to 36.9 in adult    Primary hypertension    Both eyes affected by mild nonproliferative diabetic retinopathy with macular edema, associated with type 2 diabetes mellitus    ANDERSON (obstructive sleep apnea)    Routine health maintenance    Depression    Elevated serum creatinine    Erectile dysfunction    Glaucoma suspect    Hyperlipidemia    Hypertensive retinopathy    NPDR (nonproliferative diabetic retinopathy) (Multi)    Osteoarthritis of right hip    Skin cyst    Testosterone deficiency    Tubulovillous adenoma polyp of colon    Moderate nonproliferative diabetic retinopathy of both eyes with macular edema associated with type 2 diabetes mellitus    Seasonal allergies    Agatston coronary artery calcium score less than 100    CKD (chronic kidney disease), stage II    Personal history of colonic polyps       Current Outpatient Medications:     amLODIPine (Norvasc) 10 mg tablet, take 1 tablet by mouth once daily, Disp: 90 tablet, Rfl: 1    atorvastatin (Lipitor) 40 mg tablet, Take 1 tablet (40 mg) by mouth once daily., Disp: 90 tablet, Rfl: 3    blood-glucose sensor (FreeStyle Tracey 3 Plus Sensor) device, Use as directed to measure glucose changing every 15 days, Disp: 6 each, Rfl: 3    dapagliflozin propanediol (Farxiga) 10 mg, Take 1 tablet (10 mg) by mouth once daily., Disp: 90 tablet, Rfl: 3    losartan (Cozaar) 25 mg tablet, Take 1 tablet (25 mg) by mouth once daily., Disp: 90 tablet, Rfl: 3    metFORMIN (Glucophage) 500 mg tablet, Take 1 tablet (500 mg) by mouth 2 times a day., Disp: 180 tablet, Rfl: 3    tirzepatide (Mounjaro) 7.5 mg/0.5 mL pen injector, Inject 7.5 mg under the skin every 7 days., Disp: 2 mL, Rfl: 11    tirzepatide 10 mg/0.5 mL pen injector, Inject 10 mg under the skin every 7 days., Disp: 2 mL, Rfl: 11    Current Facility-Administered Medications:     triamcinol ac (PF) in 0.9%NaCl  (KENALOG) injection 40 mg, 40 mg, intra-articular, Once, Vern Billy MD  Penicillins and Gabapentin  Social History     Tobacco Use    Smoking status: Never    Smokeless tobacco: Never   Vaping Use    Vaping status: Never Used   Substance Use Topics    Alcohol use: Yes     Comment: socially    Drug use: Never         All pertinent aspects of medical and surgical history were reviewed and updated in chart    Review of Systems   Pertinent positives in review of systems outlined above.  Complete ROS otherwise negative.        Last Recorded Vitals:  No data found.         Physical Exam  HENT:      Mouth/Throat:      Pharynx: Oropharynx is clear.   Eyes:      Extraocular Movements: Extraocular movements intact.      Conjunctiva/sclera: Conjunctivae normal.      Pupils: Pupils are equal, round, and reactive to light.   Cardiovascular:      Rate and Rhythm: Normal rate and regular rhythm.   Pulmonary:      Effort: Pulmonary effort is normal.      Breath sounds: Normal breath sounds.   Musculoskeletal:      Right lower leg: No edema.      Left lower leg: No edema.          The 10-year ASCVD risk score (Margarita CONNELL, et al., 2019) is: 26.8%    Values used to calculate the score:      Age: 59 years      Sex: Male      Is Non- : Yes      Diabetic: Yes      Tobacco smoker: No      Systolic Blood Pressure: 142 mmHg      Is BP treated: Yes      HDL Cholesterol: 33.3 mg/dL      Total Cholesterol: 159 mg/dL      Assessment/Plan   Problem List Items Addressed This Visit       Type 2 diabetes mellitus without complication, without long-term current use of insulin (Multi)     A1C dramatically better.  Continue current medications          Primary hypertension - Primary     BP still not at target.  Discussed goals for diet, exercise, sleep and stress management.   To continue current medications and repeat BP in 3mo         ANDERSON (obstructive sleep apnea)     We reviewed elements of good sleep hygiene.  To check  with medical supply company on evaluating/updating CPAP settings.          Hyperlipidemia     Lipids much better. Continue current medications and work on Portfolio style diet.             A total of 30 minutes was spent reviewing the chart and recent testing and discussing plan of care.         Ayush Cummins MD

## 2024-11-21 ENCOUNTER — APPOINTMENT (OUTPATIENT)
Dept: NEPHROLOGY | Facility: CLINIC | Age: 59
End: 2024-11-21
Payer: COMMERCIAL

## 2024-11-24 NOTE — ASSESSMENT & PLAN NOTE
BP still not at target.  Discussed goals for diet, exercise, sleep and stress management.   To continue current medications and repeat BP in 3mo

## 2024-11-24 NOTE — ASSESSMENT & PLAN NOTE
Lipids much better. Continue current medications and work on Portfolio style diet.    Detail Level: Simple Number Of Photographs Reviewed: 1 Review Findings: no new or changing lesions

## 2024-11-24 NOTE — ASSESSMENT & PLAN NOTE
We reviewed elements of good sleep hygiene.  To check with medical supply company on evaluating/updating CPAP settings.

## 2024-12-05 ENCOUNTER — APPOINTMENT (OUTPATIENT)
Dept: NEPHROLOGY | Facility: CLINIC | Age: 59
End: 2024-12-05
Payer: COMMERCIAL

## 2024-12-05 PROCEDURE — RXMED WILLOW AMBULATORY MEDICATION CHARGE

## 2024-12-11 ENCOUNTER — PHARMACY VISIT (OUTPATIENT)
Dept: PHARMACY | Facility: CLINIC | Age: 59
End: 2024-12-11
Payer: COMMERCIAL

## 2024-12-23 ENCOUNTER — PATIENT MESSAGE (OUTPATIENT)
Dept: ENDOCRINOLOGY | Facility: CLINIC | Age: 59
End: 2024-12-23
Payer: COMMERCIAL

## 2024-12-23 DIAGNOSIS — N18.2 CKD (CHRONIC KIDNEY DISEASE) STAGE 2, GFR 60-89 ML/MIN: ICD-10-CM

## 2024-12-23 DIAGNOSIS — E78.5 HYPERLIPIDEMIA LDL GOAL <100: ICD-10-CM

## 2024-12-23 DIAGNOSIS — I10 PRIMARY HYPERTENSION: ICD-10-CM

## 2024-12-23 RX ORDER — DAPAGLIFLOZIN 10 MG/1
10 TABLET, FILM COATED ORAL DAILY
Qty: 14 TABLET | Refills: 0 | Status: SHIPPED | OUTPATIENT
Start: 2024-12-23 | End: 2025-12-23

## 2024-12-23 RX ORDER — LOSARTAN POTASSIUM 25 MG/1
25 TABLET ORAL DAILY
Qty: 14 TABLET | Refills: 0 | Status: SHIPPED | OUTPATIENT
Start: 2024-12-23

## 2024-12-23 RX ORDER — ATORVASTATIN CALCIUM 40 MG/1
40 TABLET, FILM COATED ORAL DAILY
Qty: 14 TABLET | Refills: 0 | Status: SHIPPED | OUTPATIENT
Start: 2024-12-23

## 2024-12-31 PROCEDURE — RXMED WILLOW AMBULATORY MEDICATION CHARGE

## 2025-01-02 PROCEDURE — RXMED WILLOW AMBULATORY MEDICATION CHARGE

## 2025-01-06 ENCOUNTER — LAB (OUTPATIENT)
Dept: LAB | Facility: LAB | Age: 60
End: 2025-01-06
Payer: COMMERCIAL

## 2025-01-06 ENCOUNTER — APPOINTMENT (OUTPATIENT)
Dept: NEPHROLOGY | Facility: CLINIC | Age: 60
End: 2025-01-06
Payer: COMMERCIAL

## 2025-01-06 ENCOUNTER — PHARMACY VISIT (OUTPATIENT)
Dept: PHARMACY | Facility: CLINIC | Age: 60
End: 2025-01-06
Payer: COMMERCIAL

## 2025-01-06 VITALS
WEIGHT: 236 LBS | BODY MASS INDEX: 35.88 KG/M2 | SYSTOLIC BLOOD PRESSURE: 175 MMHG | OXYGEN SATURATION: 96 % | DIASTOLIC BLOOD PRESSURE: 82 MMHG | HEART RATE: 68 BPM | TEMPERATURE: 98.2 F

## 2025-01-06 DIAGNOSIS — N18.2 CKD (CHRONIC KIDNEY DISEASE) STAGE 2, GFR 60-89 ML/MIN: ICD-10-CM

## 2025-01-06 DIAGNOSIS — N18.2 CKD (CHRONIC KIDNEY DISEASE) STAGE 2, GFR 60-89 ML/MIN: Primary | ICD-10-CM

## 2025-01-06 LAB
ALBUMIN SERPL BCP-MCNC: 4.5 G/DL (ref 3.4–5)
ANION GAP SERPL CALC-SCNC: 11 MMOL/L (ref 10–20)
BUN SERPL-MCNC: 15 MG/DL (ref 6–23)
CALCIUM SERPL-MCNC: 9.7 MG/DL (ref 8.6–10.6)
CHLORIDE SERPL-SCNC: 102 MMOL/L (ref 98–107)
CO2 SERPL-SCNC: 29 MMOL/L (ref 21–32)
CREAT SERPL-MCNC: 1.25 MG/DL (ref 0.5–1.3)
CREAT UR-MCNC: 147.4 MG/DL (ref 20–370)
EGFRCR SERPLBLD CKD-EPI 2021: 67 ML/MIN/1.73M*2
GLUCOSE SERPL-MCNC: 182 MG/DL (ref 74–99)
MICROALBUMIN UR-MCNC: 667.6 MG/L
MICROALBUMIN/CREAT UR: 452.9 UG/MG CREAT
PHOSPHATE SERPL-MCNC: 3 MG/DL (ref 2.5–4.9)
POTASSIUM SERPL-SCNC: 5 MMOL/L (ref 3.5–5.3)
SODIUM SERPL-SCNC: 137 MMOL/L (ref 136–145)

## 2025-01-06 PROCEDURE — 80069 RENAL FUNCTION PANEL: CPT

## 2025-01-06 PROCEDURE — 82570 ASSAY OF URINE CREATININE: CPT

## 2025-01-06 PROCEDURE — 3079F DIAST BP 80-89 MM HG: CPT | Performed by: INTERNAL MEDICINE

## 2025-01-06 PROCEDURE — 99213 OFFICE O/P EST LOW 20 MIN: CPT | Performed by: INTERNAL MEDICINE

## 2025-01-06 PROCEDURE — 82043 UR ALBUMIN QUANTITATIVE: CPT

## 2025-01-06 PROCEDURE — 3077F SYST BP >= 140 MM HG: CPT | Performed by: INTERNAL MEDICINE

## 2025-01-06 PROCEDURE — 1036F TOBACCO NON-USER: CPT | Performed by: INTERNAL MEDICINE

## 2025-01-06 PROCEDURE — 4010F ACE/ARB THERAPY RXD/TAKEN: CPT | Performed by: INTERNAL MEDICINE

## 2025-01-06 NOTE — PROGRESS NOTES
LV 11/19/23  For follow up, doing well.  No complaints  No hospitalizations/illness since last visit  Not checking BP at home  Denies orthostatic symptoms    RoS negative for all other systems except as noted above.    Vitals:    01/06/25 1125   BP: 175/82   Pulse: 68   Temp: 36.8 °C (98.2 °F)   SpO2: 96%     No distress  HEENT:  moist, no pallor  Trace ankle puffiness emi  Breath sounds emi equal, clear  S1 S2 regular, normal, no rub or murmur  Abdomen soft  AAO x3, non focal    last labs in July 2024    59-year-old with history of hypertension and diabetes here for follow-up of CKD.    # CKD G2 A2: Most recent creatinine from 7/24/2024 is 1.2 mg per DL with EGFR 65 mL/min, UACR to 34.9.  Requested repeat labs today.  Since then, his A1c has significantly improved following changes in his diabetic therapy.  Continue to avoid NSAIDs    # Hypertension: Goal blood pressure 120/80, high today.  Advised to monitor blood pressure in the morning and evening for a week after confirming the accuracy of his home blood pressure cuff and send me the readings.  If the home average is higher than 130/80, will increase losartan to 50 mg/day.  Continue 2.5 g/day sodium restricted diet.    RTC: 4 months

## 2025-01-06 NOTE — PATIENT INSTRUCTIONS
Send me BP readings from AM and Pm for a week, if average higher than 130/80 increase Losartan to 50 mg/day

## 2025-01-07 ENCOUNTER — PHARMACY VISIT (OUTPATIENT)
Dept: PHARMACY | Facility: CLINIC | Age: 60
End: 2025-01-07
Payer: COMMERCIAL

## 2025-01-07 DIAGNOSIS — I10 PRIMARY HYPERTENSION: ICD-10-CM

## 2025-01-07 DIAGNOSIS — E78.5 HYPERLIPIDEMIA LDL GOAL <100: ICD-10-CM

## 2025-01-07 PROCEDURE — RXMED WILLOW AMBULATORY MEDICATION CHARGE

## 2025-01-07 RX ORDER — ATORVASTATIN CALCIUM 40 MG/1
40 TABLET, FILM COATED ORAL DAILY
Qty: 14 TABLET | Refills: 0 | Status: SHIPPED | OUTPATIENT
Start: 2025-01-07

## 2025-01-07 RX ORDER — AMLODIPINE BESYLATE 10 MG/1
10 TABLET ORAL DAILY
Qty: 90 TABLET | Refills: 1 | Status: SHIPPED | OUTPATIENT
Start: 2025-01-07 | End: 2025-01-08 | Stop reason: SDUPTHER

## 2025-01-08 DIAGNOSIS — I10 PRIMARY HYPERTENSION: ICD-10-CM

## 2025-01-08 RX ORDER — AMLODIPINE BESYLATE 10 MG/1
10 TABLET ORAL DAILY
Qty: 90 TABLET | Refills: 3 | Status: SHIPPED | OUTPATIENT
Start: 2025-01-08 | End: 2026-01-08

## 2025-01-15 DIAGNOSIS — I10 PRIMARY HYPERTENSION: ICD-10-CM

## 2025-01-15 RX ORDER — LOSARTAN POTASSIUM 25 MG/1
50 TABLET ORAL DAILY
Qty: 180 TABLET | Refills: 3 | Status: SHIPPED | OUTPATIENT
Start: 2025-01-15 | End: 2026-01-15

## 2025-01-29 ENCOUNTER — PHARMACY VISIT (OUTPATIENT)
Dept: PHARMACY | Facility: CLINIC | Age: 60
End: 2025-01-29
Payer: COMMERCIAL

## 2025-01-29 DIAGNOSIS — E78.5 HYPERLIPIDEMIA LDL GOAL <100: ICD-10-CM

## 2025-01-29 PROCEDURE — RXMED WILLOW AMBULATORY MEDICATION CHARGE

## 2025-01-29 RX ORDER — ATORVASTATIN CALCIUM 40 MG/1
40 TABLET, FILM COATED ORAL DAILY
Qty: 90 TABLET | Refills: 0 | Status: SHIPPED | OUTPATIENT
Start: 2025-01-29

## 2025-01-30 ENCOUNTER — PHARMACY VISIT (OUTPATIENT)
Dept: PHARMACY | Facility: CLINIC | Age: 60
End: 2025-01-30

## 2025-02-10 PROCEDURE — RXMED WILLOW AMBULATORY MEDICATION CHARGE

## 2025-02-11 ENCOUNTER — PHARMACY VISIT (OUTPATIENT)
Dept: PHARMACY | Facility: CLINIC | Age: 60
End: 2025-02-11
Payer: COMMERCIAL

## 2025-02-26 PROCEDURE — RXMED WILLOW AMBULATORY MEDICATION CHARGE

## 2025-03-04 ENCOUNTER — PHARMACY VISIT (OUTPATIENT)
Dept: PHARMACY | Facility: CLINIC | Age: 60
End: 2025-03-04
Payer: COMMERCIAL

## 2025-03-17 ENCOUNTER — PATIENT OUTREACH (OUTPATIENT)
Dept: CARE COORDINATION | Age: 60
End: 2025-03-17
Payer: COMMERCIAL

## 2025-03-21 ENCOUNTER — PATIENT MESSAGE (OUTPATIENT)
Dept: ENDOCRINOLOGY | Facility: CLINIC | Age: 60
End: 2025-03-21
Payer: COMMERCIAL

## 2025-03-21 DIAGNOSIS — E11.3513 TYPE 2 DIABETES MELLITUS WITH BOTH EYES AFFECTED BY PROLIFERATIVE RETINOPATHY AND MACULAR EDEMA, WITHOUT LONG-TERM CURRENT USE OF INSULIN: ICD-10-CM

## 2025-03-21 RX ORDER — DAPAGLIFLOZIN 10 MG/1
10 TABLET, FILM COATED ORAL DAILY
Qty: 30 TABLET | Refills: 0 | Status: SHIPPED | OUTPATIENT
Start: 2025-03-21 | End: 2025-04-20

## 2025-03-24 PROCEDURE — RXMED WILLOW AMBULATORY MEDICATION CHARGE

## 2025-03-25 ENCOUNTER — APPOINTMENT (OUTPATIENT)
Dept: ENDOCRINOLOGY | Facility: CLINIC | Age: 60
End: 2025-03-25
Payer: COMMERCIAL

## 2025-03-25 VITALS
DIASTOLIC BLOOD PRESSURE: 75 MMHG | TEMPERATURE: 97.9 F | HEIGHT: 71 IN | WEIGHT: 236 LBS | BODY MASS INDEX: 33.04 KG/M2 | SYSTOLIC BLOOD PRESSURE: 122 MMHG | HEART RATE: 81 BPM

## 2025-03-25 DIAGNOSIS — E11.9 TYPE 2 DIABETES MELLITUS WITHOUT COMPLICATION, WITHOUT LONG-TERM CURRENT USE OF INSULIN: ICD-10-CM

## 2025-03-25 DIAGNOSIS — I10 PRIMARY HYPERTENSION: ICD-10-CM

## 2025-03-25 DIAGNOSIS — E66.09 CLASS 1 OBESITY DUE TO EXCESS CALORIES WITH SERIOUS COMORBIDITY AND BODY MASS INDEX (BMI) OF 32.0 TO 32.9 IN ADULT: ICD-10-CM

## 2025-03-25 DIAGNOSIS — E78.5 HYPERLIPIDEMIA LDL GOAL <100: ICD-10-CM

## 2025-03-25 DIAGNOSIS — G47.33 OBSTRUCTIVE SLEEP APNEA: ICD-10-CM

## 2025-03-25 DIAGNOSIS — N18.2 CKD (CHRONIC KIDNEY DISEASE) STAGE 2, GFR 60-89 ML/MIN: ICD-10-CM

## 2025-03-25 DIAGNOSIS — E66.811 CLASS 1 OBESITY DUE TO EXCESS CALORIES WITH SERIOUS COMORBIDITY AND BODY MASS INDEX (BMI) OF 32.0 TO 32.9 IN ADULT: ICD-10-CM

## 2025-03-25 DIAGNOSIS — E11.3513 TYPE 2 DIABETES MELLITUS WITH BOTH EYES AFFECTED BY PROLIFERATIVE RETINOPATHY AND MACULAR EDEMA, WITHOUT LONG-TERM CURRENT USE OF INSULIN: Primary | ICD-10-CM

## 2025-03-25 LAB — POC HEMOGLOBIN A1C: 5.7 % (ref 4.2–6.5)

## 2025-03-25 PROCEDURE — 3078F DIAST BP <80 MM HG: CPT | Performed by: NURSE PRACTITIONER

## 2025-03-25 PROCEDURE — 99214 OFFICE O/P EST MOD 30 MIN: CPT | Performed by: NURSE PRACTITIONER

## 2025-03-25 PROCEDURE — 1036F TOBACCO NON-USER: CPT | Performed by: NURSE PRACTITIONER

## 2025-03-25 PROCEDURE — 95251 CONT GLUC MNTR ANALYSIS I&R: CPT | Performed by: NURSE PRACTITIONER

## 2025-03-25 PROCEDURE — 83036 HEMOGLOBIN GLYCOSYLATED A1C: CPT | Performed by: NURSE PRACTITIONER

## 2025-03-25 PROCEDURE — 3062F POS MACROALBUMINURIA REV: CPT | Performed by: NURSE PRACTITIONER

## 2025-03-25 PROCEDURE — RXMED WILLOW AMBULATORY MEDICATION CHARGE

## 2025-03-25 PROCEDURE — 3008F BODY MASS INDEX DOCD: CPT | Performed by: NURSE PRACTITIONER

## 2025-03-25 PROCEDURE — 4010F ACE/ARB THERAPY RXD/TAKEN: CPT | Performed by: NURSE PRACTITIONER

## 2025-03-25 PROCEDURE — 3074F SYST BP LT 130 MM HG: CPT | Performed by: NURSE PRACTITIONER

## 2025-03-25 RX ORDER — DAPAGLIFLOZIN 10 MG/1
10 TABLET, FILM COATED ORAL DAILY
Qty: 90 TABLET | Refills: 3 | Status: SHIPPED | OUTPATIENT
Start: 2025-03-25

## 2025-03-25 RX ORDER — METFORMIN HYDROCHLORIDE 500 MG/1
500 TABLET ORAL 2 TIMES DAILY
Qty: 180 TABLET | Refills: 3 | Status: SHIPPED | OUTPATIENT
Start: 2025-03-25

## 2025-03-25 RX ORDER — BLOOD-GLUCOSE SENSOR
EACH MISCELLANEOUS
Qty: 6 EACH | Refills: 3 | Status: SHIPPED | OUTPATIENT
Start: 2025-03-25

## 2025-03-25 ASSESSMENT — ENCOUNTER SYMPTOMS
FREQUENCY: 0
NERVOUS/ANXIOUS: 0
OCCASIONAL FEELINGS OF UNSTEADINESS: 0
DEPRESSION: 0
DIARRHEA: 0
NAUSEA: 0
SLEEP DISTURBANCE: 0
POLYPHAGIA: 0
NUMBNESS: 0
PALPITATIONS: 0
SHORTNESS OF BREATH: 0
DIZZINESS: 0
CONSTIPATION: 0
POLYDIPSIA: 0
FATIGUE: 0
ACTIVITY CHANGE: 0
SEIZURES: 0
LOSS OF SENSATION IN FEET: 0
WEAKNESS: 0
APPETITE CHANGE: 0

## 2025-03-25 ASSESSMENT — PATIENT HEALTH QUESTIONNAIRE - PHQ9
SUM OF ALL RESPONSES TO PHQ9 QUESTIONS 1 AND 2: 0
1. LITTLE INTEREST OR PLEASURE IN DOING THINGS: NOT AT ALL
2. FEELING DOWN, DEPRESSED OR HOPELESS: NOT AT ALL

## 2025-03-25 NOTE — PROGRESS NOTES
"Subjective   DENNIS Gutierrez Jr. is a 59 y.o. male who presents for follow up of Type 2 diabetes mellitus. The initial diagnosis of diabetes was made  in 2014 . The patient does not have a known family history of type 2 diabetes.    Known complications due to diabetes included retinopathy and proteinuria    Cardiovascular risk factors include advanced age (older than 55 for men, 65 for women), diabetes mellitus, dyslipidemia, hypertension, male gender, microalbuminuria, and obesity (BMI >= 30 kg/m2). The patient is on an ACE inhibitor or angiotensin II receptor blocker.      Current diabetes regimen is as follows:   Mounjaro 10 mg weekly   Metformin 500 mg 2 tablets once daily  Farxiga 10 mg daily    The patient is currently checking the blood glucose using Tracey 3 continuous glucose monitor.     Hypoglycemia frequency: none  Hypoglycemia awareness: Yes     Exercise: every other day   Meal panning: He is using no plan.    Review of Systems   Constitutional:  Negative for activity change, appetite change and fatigue.   Respiratory:  Negative for shortness of breath.    Cardiovascular:  Negative for chest pain, palpitations and leg swelling.   Gastrointestinal:  Negative for constipation, diarrhea and nausea.   Endocrine: Negative for cold intolerance, heat intolerance, polydipsia, polyphagia and polyuria.   Genitourinary:  Negative for frequency.   Musculoskeletal:  Negative for gait problem.   Skin:  Negative for rash.   Neurological:  Negative for dizziness, seizures, weakness and numbness.   Psychiatric/Behavioral:  Negative for sleep disturbance and suicidal ideas. The patient is not nervous/anxious.      Objective   /85 (BP Location: Right arm, Patient Position: Sitting, BP Cuff Size: Adult)   Pulse 89   Temp 34.9 °C (94.9 °F) (Temporal)   Ht 1.803 m (5' 11\")   Wt 107 kg (236 lb)   BMI 32.92 kg/m²   Physical Exam  Constitutional:       Appearance: He is obese.   Pulmonary:      Effort: Pulmonary effort " is normal.   Skin:     General: Skin is warm and dry.   Neurological:      Mental Status: He is alert and oriented to person, place, and time.   Psychiatric:         Mood and Affect: Mood normal.         Behavior: Behavior normal.         Thought Content: Thought content normal.         Judgment: Judgment normal.       Lab Review  Glucose (mg/dL)   Date Value   01/06/2025 182 (H)   07/24/2024 124 (H)   03/08/2024 127 (H)     POC HEMOGLOBIN A1c (%)   Date Value   03/25/2024 5.8   11/21/2023 8.3 (A)     Hemoglobin A1C (%)   Date Value   07/24/2024 5.1   07/28/2023 8.3 (A)   07/25/2022 7.7 (A)   03/14/2022 8.8 (A)   12/21/2020 8.8 (H)     Bicarbonate (mmol/L)   Date Value   01/06/2025 29   07/24/2024 27   03/08/2024 30     Urea Nitrogen (mg/dL)   Date Value   01/06/2025 15   07/24/2024 18   03/08/2024 17     Creatinine (mg/dL)   Date Value   01/06/2025 1.25   07/24/2024 1.29   03/08/2024 1.16       Downloaded and interrogated Tracey CGM:   Average Glucose 121 mg/dL  Glucose Management Indicator (GMI) 6.2%  Glucose Variability 23.9%  Time in Range 95%    Health Maintenance:   Foot Exam: Yearly through primary care.   Eye Exam: July 2024, he has had injections in both eyes. He is aware he is due for follow up and agrees to call for appointment.   Lipid: Total 159 LDL 85 July 2024  Urine Albumin: 452.9 alb/Creat ratio Jan 2025 He is seeing nephrology.     Assessment/Plan       Type 2 diabetes mellitus, is at goal with a GMI 6.2% at today visit and A1c 5.7%  RX changes:   Continue Mounjaro 10 mg weekly. We discussed that we can increase if weight loss stalls at any time and he can message through [email or web address] in between appointments.   Continue Metformin 500 mg 1 tablet twice daily  CONTINUE Farxiga 10 mg daily. If you are not eating, or ill do not take.   START TRACEY 3+, this is now a 15 day sensor, use the same azalia.   Education:  interpretation of lab results, blood sugar goals, and complications of diabetes mellitus  Sleep  Apnea: Instructed to use mask nightly.  Hypertension: BP elevated at today's visit. His BP medication was increased by nephrology recently and he has not started taking the new dose. Instructed to begin tonight.   Hyperlipidemia: At goal. No changes.   Retinopathy: Followed closely by retinologist. Agrees to call for follow up appointment.   Proteinuria: Appropriately on SGLT2 and followed by nephrology.   BMI 32: Instructed to increase whole fruits/vegestables & reduce packaged/processed food   Follow up: I recommend diabetes care be in 6 months.

## 2025-03-25 NOTE — PATIENT INSTRUCTIONS
Type 2 diabetes mellitus, is at goal with a GMI 6.2% at today visit and A1c 5.7%  RX changes:   Continue Mounjaro 10 mg weekly. We discussed that we can increase if weight loss stalls at any time and he can message through [employer] in between appointments.   Continue Metformin 500 mg 1 tablet twice daily  CONTINUE Farxiga 10 mg daily. If you are not eating, or ill do not take.   START COLETTE 3+, this is now a 15 day sensor, use the same azalia.   Education:  interpretation of lab results, blood sugar goals, and complications of diabetes mellitus  Sleep Apnea: Instructed to use mask nightly.  Hypertension: BP elevated at today's visit. His BP medication was increased by nephrology recently and he has not started taking the new dose. Instructed to begin tonight.   Hyperlipidemia: At goal. No changes.   Retinopathy: Followed closely by retinologist. Agrees to call for follow up appointment.   Proteinuria: Appropriately on SGLT2 and followed by nephrology.   BMI 32: Instructed to increase whole fruits/vegestables & reduce packaged/processed food   Follow up: I recommend diabetes care be in 6 months.

## 2025-03-27 ENCOUNTER — APPOINTMENT (OUTPATIENT)
Dept: ENDOCRINOLOGY | Facility: CLINIC | Age: 60
End: 2025-03-27
Payer: COMMERCIAL

## 2025-03-28 ENCOUNTER — OFFICE VISIT (OUTPATIENT)
Dept: PRIMARY CARE | Facility: HOSPITAL | Age: 60
End: 2025-03-28
Payer: COMMERCIAL

## 2025-03-28 VITALS
HEART RATE: 86 BPM | WEIGHT: 230 LBS | TEMPERATURE: 96.9 F | HEIGHT: 69 IN | BODY MASS INDEX: 34.07 KG/M2 | SYSTOLIC BLOOD PRESSURE: 138 MMHG | DIASTOLIC BLOOD PRESSURE: 71 MMHG

## 2025-03-28 DIAGNOSIS — E11.29 TYPE 2 DIABETES MELLITUS WITH DIABETIC MICROALBUMINURIA, WITHOUT LONG-TERM CURRENT USE OF INSULIN (MULTI): ICD-10-CM

## 2025-03-28 DIAGNOSIS — G47.33 OSA ON CPAP: ICD-10-CM

## 2025-03-28 DIAGNOSIS — Z76.89 ENCOUNTER TO ESTABLISH CARE: Primary | ICD-10-CM

## 2025-03-28 DIAGNOSIS — E78.5 HYPERLIPIDEMIA LDL GOAL <100: ICD-10-CM

## 2025-03-28 DIAGNOSIS — G47.09 OTHER INSOMNIA: ICD-10-CM

## 2025-03-28 DIAGNOSIS — Z23 NEED FOR PNEUMOCOCCAL 20-VALENT CONJUGATE VACCINATION: ICD-10-CM

## 2025-03-28 DIAGNOSIS — Z12.5 SCREENING FOR PROSTATE CANCER: ICD-10-CM

## 2025-03-28 DIAGNOSIS — R80.9 TYPE 2 DIABETES MELLITUS WITH DIABETIC MICROALBUMINURIA, WITHOUT LONG-TERM CURRENT USE OF INSULIN (MULTI): ICD-10-CM

## 2025-03-28 DIAGNOSIS — Z23 NEED FOR SHINGLES VACCINE: ICD-10-CM

## 2025-03-28 PROBLEM — Z00.00 ROUTINE HEALTH MAINTENANCE: Status: RESOLVED | Noted: 2023-09-08 | Resolved: 2025-03-28

## 2025-03-28 PROBLEM — Z09 FOLLOW-UP EXAM: Status: RESOLVED | Noted: 2023-08-08 | Resolved: 2025-03-28

## 2025-03-28 PROCEDURE — 3078F DIAST BP <80 MM HG: CPT | Performed by: INTERNAL MEDICINE

## 2025-03-28 PROCEDURE — 3008F BODY MASS INDEX DOCD: CPT | Performed by: INTERNAL MEDICINE

## 2025-03-28 PROCEDURE — 4010F ACE/ARB THERAPY RXD/TAKEN: CPT | Performed by: INTERNAL MEDICINE

## 2025-03-28 PROCEDURE — RXMED WILLOW AMBULATORY MEDICATION CHARGE

## 2025-03-28 PROCEDURE — 1036F TOBACCO NON-USER: CPT | Performed by: INTERNAL MEDICINE

## 2025-03-28 PROCEDURE — 3075F SYST BP GE 130 - 139MM HG: CPT | Performed by: INTERNAL MEDICINE

## 2025-03-28 PROCEDURE — 3062F POS MACROALBUMINURIA REV: CPT | Performed by: INTERNAL MEDICINE

## 2025-03-28 PROCEDURE — 99214 OFFICE O/P EST MOD 30 MIN: CPT | Performed by: INTERNAL MEDICINE

## 2025-03-28 RX ORDER — ATORVASTATIN CALCIUM 40 MG/1
40 TABLET, FILM COATED ORAL DAILY
Qty: 90 TABLET | Refills: 3 | Status: SHIPPED | OUTPATIENT
Start: 2025-03-28

## 2025-03-28 RX ORDER — TRAZODONE HYDROCHLORIDE 50 MG/1
50 TABLET ORAL NIGHTLY PRN
Qty: 90 TABLET | Refills: 1 | Status: SHIPPED | OUTPATIENT
Start: 2025-03-28 | End: 2026-03-28

## 2025-03-28 ASSESSMENT — ENCOUNTER SYMPTOMS
FEVER: 0
SHORTNESS OF BREATH: 0
COUGH: 0
PALPITATIONS: 0
CHILLS: 0
POLYDIPSIA: 0

## 2025-03-28 NOTE — PROGRESS NOTES
Subjective   Patient ID: DENNIS Gutierrez Jr. is a 59 y.o. male who presents for [facility] (TC FROM DR. WINSLOW).    59-year-old male presents today to [facility] for care, his previous doctor is departed from his healthcare system and he is in need of a new primary care doctor.  He has a history of controlled type 2 diabetes, hypertension, hyperlipidemia, diabetic nephropathy, and obstructive sleep apnea currently on CPAP therapy.  His diabetes is very well-controlled in the normal range for the average blood sugar and the combination of medicines he is using it he still has microalbuminuria on his testing.  His nephrologist is recently increased his losartan and repeat testing was ordered by me today.  Further titration of the losartan and possibly Karendia if the urine protein levels remain elevated would be advised.  The patient's blood sugar is very well-controlled he follows with endocrinology there monitoring his medications and dosing at this time.  He has a history of obstructive sleep apnea on CPAP estimates that his machine is approximately 5 years old.  He has a current ongoing issue where he is waking up at night around 1 to 2 AM and having difficulty getting back to sleep.  This has been a consistent trend.  He does believe that this is independent of his sleep apnea but also reports that his mask fit and sleep equipment is not always comfortable and does contribute to this issue as well as sleep quality in general.  He is consistent with using it on most nights.  He has not seen a sleep specialist in 5 years for evaluation of his sleep equipment or apnea control.         Review of Systems   Constitutional:  Negative for chills and fever.   Respiratory:  Negative for cough and shortness of breath.    Cardiovascular:  Negative for chest pain and palpitations.   Endocrine: Negative for polydipsia and polyuria.       Objective   /71   Pulse 86   Temp 36.1 °C (96.9 °F) (Temporal)   Ht 1.753 m (5'  "9\")   Wt 104 kg (230 lb)   BMI 33.97 kg/m²     Physical Exam  Constitutional:       Appearance: Normal appearance.   HENT:      Head: Normocephalic and atraumatic.   Eyes:      Extraocular Movements: Extraocular movements intact.      Pupils: Pupils are equal, round, and reactive to light.   Neck:      Thyroid: No thyroid mass or thyromegaly.   Cardiovascular:      Rate and Rhythm: Normal rate and regular rhythm.      Heart sounds: No murmur heard.     No friction rub. No gallop.   Pulmonary:      Effort: No respiratory distress.      Breath sounds: No wheezing, rhonchi or rales.   Musculoskeletal:      Cervical back: Neck supple.      Right lower leg: No edema.      Left lower leg: No edema.   Neurological:      Mental Status: He is alert.         Assessment/Plan   Assessment & Plan  Encounter to establish care         Type 2 diabetes mellitus with diabetic microalbuminuria, without long-term current use of insulin (Multi)    Orders:    Albumin-Creatinine Ratio, Urine Random; Future    Hyperlipidemia LDL goal <100    Orders:    atorvastatin (Lipitor) 40 mg tablet; Take 1 tablet (40 mg) by mouth once daily.    Screening for prostate cancer    Orders:    Prostate Specific Antigen, Screen; Future    Need for pneumococcal 20-valent conjugate vaccination         Need for shingles vaccine         ANDERSON on CPAP    Orders:    Referral to Adult Sleep Medicine; Future    Other insomnia    Orders:    traZODone (Desyrel) 50 mg tablet; Take 1 tablet (50 mg) by mouth as needed at bedtime for sleep.           "

## 2025-04-01 ENCOUNTER — PHARMACY VISIT (OUTPATIENT)
Dept: PHARMACY | Facility: CLINIC | Age: 60
End: 2025-04-01
Payer: COMMERCIAL

## 2025-04-02 PROCEDURE — RXMED WILLOW AMBULATORY MEDICATION CHARGE

## 2025-04-07 ENCOUNTER — PHARMACY VISIT (OUTPATIENT)
Dept: PHARMACY | Facility: CLINIC | Age: 60
End: 2025-04-07
Payer: COMMERCIAL

## 2025-04-14 ENCOUNTER — APPOINTMENT (OUTPATIENT)
Facility: CLINIC | Age: 60
End: 2025-04-14
Payer: COMMERCIAL

## 2025-04-14 DIAGNOSIS — G47.30 SLEEP APNEA, UNSPECIFIED TYPE: Primary | ICD-10-CM

## 2025-04-14 DIAGNOSIS — E66.9 OBESITY (BMI 30-39.9): ICD-10-CM

## 2025-04-14 DIAGNOSIS — G47.33 OSA ON CPAP: ICD-10-CM

## 2025-04-14 PROCEDURE — 3044F HG A1C LEVEL LT 7.0%: CPT | Performed by: GENERAL PRACTICE

## 2025-04-14 PROCEDURE — 4010F ACE/ARB THERAPY RXD/TAKEN: CPT | Performed by: GENERAL PRACTICE

## 2025-04-14 PROCEDURE — 1036F TOBACCO NON-USER: CPT | Performed by: GENERAL PRACTICE

## 2025-04-14 PROCEDURE — 99204 OFFICE O/P NEW MOD 45 MIN: CPT | Performed by: GENERAL PRACTICE

## 2025-04-14 PROCEDURE — G8433 SCR FOR DEP NOT CPT DOC RSN: HCPCS | Performed by: GENERAL PRACTICE

## 2025-04-14 PROCEDURE — 3062F POS MACROALBUMINURIA REV: CPT | Performed by: GENERAL PRACTICE

## 2025-04-14 ASSESSMENT — ENCOUNTER SYMPTOMS
OCCASIONAL FEELINGS OF UNSTEADINESS: 0
DEPRESSION: 0
LOSS OF SENSATION IN FEET: 0

## 2025-04-14 ASSESSMENT — PATIENT HEALTH QUESTIONNAIRE - PHQ9
2. FEELING DOWN, DEPRESSED OR HOPELESS: NOT AT ALL
SUM OF ALL RESPONSES TO PHQ9 QUESTIONS 1 AND 2: 0
1. LITTLE INTEREST OR PLEASURE IN DOING THINGS: NOT AT ALL

## 2025-04-14 ASSESSMENT — COLUMBIA-SUICIDE SEVERITY RATING SCALE - C-SSRS
2. HAVE YOU ACTUALLY HAD ANY THOUGHTS OF KILLING YOURSELF?: NO
1. IN THE PAST MONTH, HAVE YOU WISHED YOU WERE DEAD OR WISHED YOU COULD GO TO SLEEP AND NOT WAKE UP?: NO
6. HAVE YOU EVER DONE ANYTHING, STARTED TO DO ANYTHING, OR PREPARED TO DO ANYTHING TO END YOUR LIFE?: NO

## 2025-04-14 NOTE — PROGRESS NOTES
"     Patient: Jose Angel Gutierrez Jr.    [id number]  : 1965 -- AGE 59 y.o.    Provider: Staci Stroud DO     Location [facility]   Service Date: 2025              [facility]  New Visit Note    Virtual or Telephone Consent  An interactive audio and video telecommunication system which permits real time communications between the patient (at the originating site) and provider (at the distant site) was utilized to provide this telehealth service.     Verbal consent was requested and obtained from Jose Angel Gutierrez Jr. on this date, 25 for a telehealth visit and the patient's location was confirmed at the time of the visit.      HISTORY OF PRESENT ILLNESS     The patient's referring provider is: Paul Flores MD    HISTORY OF PRESENT ILLNESS   Jose Angel Gutierrez Jr. \"TJ\" is a 59 y.o. male who presents to a [facility] for a sleep medicine evaluation with concerns of Sleep Apnea.     The patient  has a past medical history of Agatston coronary artery calcium score less than 100 (10/09/2023), CKD (chronic kidney disease), stage II, Hyperlipidemia, Hypertension, ANDERSON (obstructive sleep apnea) (), Personal history of colonic polyps, Seasonal allergies, and Type II diabetes mellitus with nephropathy (Multi)..    PAST SLEEP HISTORY    Patient had a sleep study done around [id number]? Due to snoring and night time awakenings and daytime sleepiness. He was found to have sleep apnea and was started on pap therapy. He used pap therapy intermittently for a few yrs; he stopped using it around  as he felt that his symptoms improved when he lost some weight. He used to follow with [facility] but moved to the [facility] around .     CURRENT HISTORY    On today's visit, 2025, the patient reports that he is establishing care for his sleep apnea.   He has a replacement from [employer]. He believes that it is from around " 2020?   He is not currently using pap therapy.     Sleep schedule  on weekdays / work days:  Usual Bedtime: 11pm  Sleep latency: 10-15 min  Wake time : 6 am  Total sleep time average/day: 5-7 hours/day  Awakenings: 2x per night, nocturia/ unknown, varies in length.   Naps: sometimes on the weekends in the afternoon.       Sleep schedule  on weekends/non work days :  Usual Bedtime:   11pm  Wake time : 6-8 am    Sleep aids: has rx for trazodone by pcp, has not tried it yet.   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.     Preferred sleeping position: SLEEP POSITION: sidelying    Sleep-related ROS:    Snoring:  y  Witnessed apneas:     n   Gasping/ choking: n     Am Dry mouth:  n           Nasal congestion:  seasonal allergies       am headaches: n    Sleep is described as unrefreshing.     Daytime sleepiness: sometimes  Fatigue or decreased energy: sometimes  Difficulty remembering things in daytime: sometimes  Difficulty staying focused in daytime: sometimes  Irritable during the day: n    Drowsy driving: n  Hx of car accident: n  Near-miss Car accident: n      RLS screen:  RLSSCREEN: - Sensations: Patient does not have unusual sensations in their extremities that cause an urge to move them     Sleep-related behaviors: DENIES      ESS: No data recorded         REVIEW OF SYSTEMS     REVIEW OF SYSTEMS  Review of Systems   All other systems reviewed and are negative.      ALLERGIES AND MEDICATIONS     ALLERGIES  Allergies   Allergen Reactions    Penicillins Other     Told since childhood.  Unsure of reaction    Gabapentin Nausea Only       MEDICATIONS  Current Outpatient Medications   Medication Sig Dispense Refill    amLODIPine (Norvasc) 10 mg tablet Take 1 tablet (10 mg) by mouth once daily. 90 tablet 3    atorvastatin (Lipitor) 40 mg tablet Take 1 tablet (40 mg) by mouth once daily. 90 tablet 3    blood-glucose sensor (FreeStyle Tracey 3 Plus Sensor) device Use as directed to measure  glucose changing every 15 days 6 each 3    dapagliflozin propanediol (Farxiga) 10 mg tablet Take 1 tablet (10 mg) by mouth once daily. 30 tablet 0    dapagliflozin propanediol (Farxiga) 10 mg Take 1 tablet (10 mg) by mouth once daily. 90 tablet 3    losartan (Cozaar) 25 mg tablet Take 2 tablets (50 mg) by mouth once daily. 180 tablet 3    metFORMIN (Glucophage) 500 mg tablet Take 1 tablet (500 mg) by mouth 2 times a day. 180 tablet 3    tirzepatide 10 mg/0.5 mL pen injector Inject 10 mg under the skin every 7 days. 2 mL 11    tirzepatide 10 mg/0.5 mL pen injector Inject 10 mg under the skin every 7 days. 2 mL 11    traZODone (Desyrel) 50 mg tablet Take 1 tablet (50 mg) by mouth as needed at bedtime for sleep. 90 tablet 1     Current Facility-Administered Medications   Medication Dose Route Frequency Provider Last Rate Last Admin    triamcinol ac (PF) in 0.9%NaCl (KENALOG) injection 40 mg  40 mg intra-articular Once Vern Billy MD             PAST HISTORY     PAST MEDICAL HISTORY  He  has a past medical history of Agatston coronary artery calcium score less than 100 (10/09/2023), CKD (chronic kidney disease), stage II, Hyperlipidemia, Hypertension, ANDERSON (obstructive sleep apnea) (2020), Personal history of colonic polyps, Seasonal allergies, and Type II diabetes mellitus with nephropathy (Multi).    PAST SURGICAL HISTORY:  Past Surgical History:   Procedure Laterality Date    OTHER SURGICAL HISTORY Bilateral 03/14/2022    Hip surgery - THR       FAMILY HISTORY  Family History   Problem Relation Name Age of Onset    Cancer Mother          smoker ? lung    Stroke Father      Diabetes Sister      Hypertension Sister      No Known Problems Daughter          [employer] - marketing    No Known Problems Son          working at [employer] in [place]    No Known Problems Mother's Brother       DOES/DOES NOT EC: does not have a family history of sleep disorder.      SOCIAL HISTORY  He  reports that he has never  smoked. He has been exposed to tobacco smoke. He has never used smokeless tobacco. He reports current alcohol use. He reports that he does not use drugs.    Caffeine consumption: 2-3 diet coke with meals.   Alcohol consumption: 2x per week.   Smoking: n  Marijuana: n  Other drugs: n      PHYSICAL EXAM     VITAL SIGNS: There were no vitals taken for this visit.     PREVIOUS WEIGHTS:  Wt Readings from Last 3 Encounters:   03/28/25 104 kg (230 lb)   03/25/25 107 kg (236 lb)   01/06/25 107 kg (236 lb)       Physical Exam  CONSTITUTIONAL: Patient appears well developed and well nourished.   GENERAL: This is a healthy appearing patient . The patient is alert and appropriately verbally conversant   FACE: The face was inspected and no cutaneous masses or lesions were visualized.   EYES: Extra-ocular muscle function was intact. No nystagmus was observed.  ORAL CAVITY: Examination of the oral cavity revealed no mass lesions nor infection.   EARS: Examination of the ears revealed that the auricles were normally formed with no lesions.   NECK: No skin lesions or inflammatory processes were detected on visual inspection.  CARDIOVASCULAR: Peripheral perfusion intact, no evidence of cyanosis, or clubbing.   RESPIRATORY: Normal inspiration and expiration and chest wall expansion, no use of accessory muscles to breathe, no stridor.  NEUROLOGICAL: Mentation is clear. Alert and oriented to time, place, and person. Answering questions appropriately.  PSYCHIATRIC: Normal affect and appropriate behavior. Mood is without obvious agitation or disturbance.         RESULTS/DATA       ASSESSMENT/PLAN     Mr. Gutierrez is a 59 y.o. male and  has a past medical history of Agatston coronary artery calcium score less than 100 (10/09/2023), CKD (chronic kidney disease), stage II, Hyperlipidemia, Hypertension, ANDERSON (obstructive sleep apnea) (2020), Personal history of colonic polyps, Seasonal allergies, and Type II diabetes mellitus with nephropathy  (Multi). He was referred to the [facility] for evaluation of sleep apnea.     Problem List Items Addressed This Visit       ANDERSON on CPAP       Problem List and Orders    Pmhx includes HTN, DM2.     1- sleep apnea?     Sleep study records pending; [facility], trying to obtain.     -pls restart using pap therapy;  Autopap 5-20cwp, he has a jaquelin replacement and most recent download is from 2020    -patient does not wish to get another sleep study at this point.   -ordered supplies through [facility].  His previous DME is unknown.      -do not drive or operate heavy machinery if drowsy.  -avoid sleeping on your back.   -avoid sedating substances/ medication, alcohol, illicit drugs and tobacco.    2- Obesity  counseled on eating a healthy diet and exercising as tolerated.    Follow up in 3 months or sooner as needed.

## 2025-04-21 ENCOUNTER — APPOINTMENT (OUTPATIENT)
Dept: OPHTHALMOLOGY | Facility: CLINIC | Age: 60
End: 2025-04-21
Payer: COMMERCIAL

## 2025-04-21 DIAGNOSIS — H40.003 GLAUCOMA SUSPECT OF BOTH EYES: Primary | ICD-10-CM

## 2025-04-21 DIAGNOSIS — H25.13 AGE-RELATED NUCLEAR CATARACT OF BOTH EYES: ICD-10-CM

## 2025-04-21 DIAGNOSIS — E11.3313 MODERATE NONPROLIFERATIVE DIABETIC RETINOPATHY OF BOTH EYES WITH MACULAR EDEMA ASSOCIATED WITH TYPE 2 DIABETES MELLITUS: ICD-10-CM

## 2025-04-21 PROCEDURE — 92083 EXTENDED VISUAL FIELD XM: CPT | Performed by: OPHTHALMOLOGY

## 2025-04-21 PROCEDURE — 99213 OFFICE O/P EST LOW 20 MIN: CPT | Performed by: OPHTHALMOLOGY

## 2025-04-21 ASSESSMENT — ENCOUNTER SYMPTOMS
GASTROINTESTINAL NEGATIVE: 0
PSYCHIATRIC NEGATIVE: 0
ALLERGIC/IMMUNOLOGIC NEGATIVE: 0
CARDIOVASCULAR NEGATIVE: 0
NEUROLOGICAL NEGATIVE: 0
CONSTITUTIONAL NEGATIVE: 0
HEMATOLOGIC/LYMPHATIC NEGATIVE: 0
EYES NEGATIVE: 1
RESPIRATORY NEGATIVE: 0
ENDOCRINE NEGATIVE: 0
MUSCULOSKELETAL NEGATIVE: 0

## 2025-04-21 ASSESSMENT — TONOMETRY
IOP_METHOD: GOLDMANN APPLANATION
OS_IOP_MMHG: 13
OD_IOP_MMHG: 15

## 2025-04-21 ASSESSMENT — VISUAL ACUITY
OS_SC: 20/20
METHOD: SNELLEN - LINEAR
OD_SC+: +1
OD_SC: 20/25
OS_SC+: -2

## 2025-04-21 ASSESSMENT — SLIT LAMP EXAM - LIDS
COMMENTS: GOOD POSITION
COMMENTS: GOOD POSITION

## 2025-04-21 ASSESSMENT — CONF VISUAL FIELD
OS_INFERIOR_NASAL_RESTRICTION: 0
OS_SUPERIOR_NASAL_RESTRICTION: 0
OD_SUPERIOR_NASAL_RESTRICTION: 0
OS_SUPERIOR_TEMPORAL_RESTRICTION: 0
OD_SUPERIOR_TEMPORAL_RESTRICTION: 0
OD_INFERIOR_NASAL_RESTRICTION: 0
OD_INFERIOR_TEMPORAL_RESTRICTION: 0
OS_NORMAL: 1
OS_INFERIOR_TEMPORAL_RESTRICTION: 0
OD_NORMAL: 1

## 2025-04-21 ASSESSMENT — EXTERNAL EXAM - LEFT EYE: OS_EXAM: NORMAL

## 2025-04-21 ASSESSMENT — CUP TO DISC RATIO
OD_RATIO: .65
OS_RATIO: .65

## 2025-04-21 ASSESSMENT — PACHYMETRY
EXAM_DATE: 4/21/2025
OS_CT(UM): 549
OD_CT(UM): 541

## 2025-04-21 ASSESSMENT — EXTERNAL EXAM - RIGHT EYE: OD_EXAM: NORMAL

## 2025-04-21 NOTE — PROGRESS NOTES
glaucoma suspect, based on c/d ratio     Gonioscopy: OU C35F1+ pigment      - TMax: normal range     -family hx +in father     - Gonio 8/24/23: OU C35F1      - OCT RNFL 8/24/23: thinning OU, confounded by artifact on last 2 attempts       Segal Visual Field - OU - Both Eyes          Enlarged blind spot OU, no evidenc eof glaucoma           - pathophys of glaucoma, potential for blindness reviewed     - importance of f/u stressed     -ok to monitor off therapy for now     - RTC 12 months for HVF 24-2, dfe, oct rnfl, sooner PRN          PDR OU      - Follows with Dr. Kim,

## 2025-04-22 DIAGNOSIS — E11.3513 TYPE 2 DIABETES MELLITUS WITH BOTH EYES AFFECTED BY PROLIFERATIVE RETINOPATHY AND MACULAR EDEMA, WITHOUT LONG-TERM CURRENT USE OF INSULIN: ICD-10-CM

## 2025-04-23 RX ORDER — DAPAGLIFLOZIN 10 MG/1
10 TABLET, FILM COATED ORAL DAILY
Qty: 30 TABLET | Refills: 0 | OUTPATIENT
Start: 2025-04-23 | End: 2025-05-23

## 2025-05-08 PROCEDURE — RXMED WILLOW AMBULATORY MEDICATION CHARGE

## 2025-05-12 ENCOUNTER — PHARMACY VISIT (OUTPATIENT)
Dept: PHARMACY | Facility: CLINIC | Age: 60
End: 2025-05-12
Payer: COMMERCIAL

## 2025-05-12 PROCEDURE — RXMED WILLOW AMBULATORY MEDICATION CHARGE

## 2025-06-06 ENCOUNTER — APPOINTMENT (OUTPATIENT)
Facility: CLINIC | Age: 60
End: 2025-06-06
Payer: COMMERCIAL

## 2025-06-17 PROCEDURE — RXMED WILLOW AMBULATORY MEDICATION CHARGE

## 2025-06-24 ENCOUNTER — PHARMACY VISIT (OUTPATIENT)
Dept: PHARMACY | Facility: CLINIC | Age: 60
End: 2025-06-24
Payer: COMMERCIAL

## 2025-06-24 PROCEDURE — RXMED WILLOW AMBULATORY MEDICATION CHARGE

## 2025-06-24 RX ORDER — AZITHROMYCIN 250 MG/1
TABLET, FILM COATED ORAL
Qty: 6 TABLET | Refills: 0 | OUTPATIENT
Start: 2025-06-19

## 2025-06-28 DIAGNOSIS — Z12.5 SCREENING FOR PROSTATE CANCER: ICD-10-CM

## 2025-07-22 ENCOUNTER — PHARMACY VISIT (OUTPATIENT)
Dept: PHARMACY | Facility: CLINIC | Age: 60
End: 2025-07-22
Payer: COMMERCIAL

## 2025-07-22 PROCEDURE — RXMED WILLOW AMBULATORY MEDICATION CHARGE

## 2025-08-28 ENCOUNTER — PHARMACY VISIT (OUTPATIENT)
Dept: PHARMACY | Facility: CLINIC | Age: 60
End: 2025-08-28
Payer: COMMERCIAL

## 2025-08-28 PROCEDURE — RXMED WILLOW AMBULATORY MEDICATION CHARGE

## 2025-09-29 ENCOUNTER — APPOINTMENT (OUTPATIENT)
Dept: ENDOCRINOLOGY | Facility: CLINIC | Age: 60
End: 2025-09-29
Payer: COMMERCIAL

## 2026-05-07 ENCOUNTER — APPOINTMENT (OUTPATIENT)
Dept: OPHTHALMOLOGY | Facility: CLINIC | Age: 61
End: 2026-05-07
Payer: COMMERCIAL